# Patient Record
Sex: MALE | Race: OTHER | Employment: PART TIME | URBAN - METROPOLITAN AREA
[De-identification: names, ages, dates, MRNs, and addresses within clinical notes are randomized per-mention and may not be internally consistent; named-entity substitution may affect disease eponyms.]

---

## 2021-03-22 ENCOUNTER — TELEPHONE (OUTPATIENT)
Dept: GASTROENTEROLOGY | Age: 21
End: 2021-03-22

## 2021-03-23 ENCOUNTER — OFFICE VISIT (OUTPATIENT)
Dept: GASTROENTEROLOGY | Age: 21
End: 2021-03-23
Payer: MEDICARE

## 2021-03-23 ENCOUNTER — HOSPITAL ENCOUNTER (OUTPATIENT)
Age: 21
Setting detail: SPECIMEN
Discharge: HOME | End: 2021-03-23
Payer: MEDICARE

## 2021-03-23 VITALS
SYSTOLIC BLOOD PRESSURE: 124 MMHG | HEIGHT: 70 IN | TEMPERATURE: 98.1 F | HEART RATE: 86 BPM | RESPIRATION RATE: 14 BRPM | DIASTOLIC BLOOD PRESSURE: 62 MMHG | BODY MASS INDEX: 19.76 KG/M2 | WEIGHT: 138 LBS

## 2021-03-23 DIAGNOSIS — K52.9 COLITIS: ICD-10-CM

## 2021-03-23 DIAGNOSIS — K61.0 PERIANAL ABSCESS: ICD-10-CM

## 2021-03-23 DIAGNOSIS — K52.9 COLITIS: Primary | ICD-10-CM

## 2021-03-23 PROCEDURE — G8484 FLU IMMUNIZE NO ADMIN: HCPCS | Performed by: INTERNAL MEDICINE

## 2021-03-23 PROCEDURE — G8427 DOCREV CUR MEDS BY ELIG CLIN: HCPCS | Performed by: INTERNAL MEDICINE

## 2021-03-23 PROCEDURE — G8420 CALC BMI NORM PARAMETERS: HCPCS | Performed by: INTERNAL MEDICINE

## 2021-03-23 PROCEDURE — 4004F PT TOBACCO SCREEN RCVD TLK: CPT | Performed by: INTERNAL MEDICINE

## 2021-03-23 PROCEDURE — 99204 OFFICE O/P NEW MOD 45 MIN: CPT | Performed by: INTERNAL MEDICINE

## 2021-03-23 RX ORDER — HYDROCORTISONE ACETATE PRAMOXINE HCL 2.5; 1 G/100G; G/100G
CREAM TOPICAL 3 TIMES DAILY PRN
COMMUNITY
End: 2022-04-06

## 2021-03-23 RX ORDER — LIDOCAINE 50 MG/G
OINTMENT TOPICAL PRN
COMMUNITY
End: 2022-04-06

## 2021-03-23 RX ORDER — ACETAMINOPHEN 500 MG
500 TABLET ORAL EVERY 6 HOURS PRN
COMMUNITY

## 2021-03-23 RX ORDER — POLYETHYLENE GLYCOL 3350 17 G/17G
POWDER, FOR SOLUTION ORAL
Qty: 238 G | Refills: 0 | Status: ON HOLD | OUTPATIENT
Start: 2021-03-23 | End: 2021-04-01 | Stop reason: ALTCHOICE

## 2021-03-23 ASSESSMENT — ENCOUNTER SYMPTOMS
BLOOD IN STOOL: 0
CONSTIPATION: 0
RECTAL PAIN: 1
ABDOMINAL DISTENTION: 0
CHOKING: 0
TROUBLE SWALLOWING: 0
DIARRHEA: 0
COUGH: 0
VOMITING: 0
WHEEZING: 0
ANAL BLEEDING: 0
ABDOMINAL PAIN: 1
NAUSEA: 0

## 2021-03-23 NOTE — PROGRESS NOTES
Reason for Referral:   Steve Gann MD  [address] [facility],  [address] [place] [address]    Chief Complaint   Patient presents with    Ulcerative Colitis     Patient is new, referred for Ulcerative Colitis. He had colonoscopy with Dr Royce Mtz on 3/8/21           HISTORY OF PRESENT ILLNESS: Seema Chamorro is a 21 y.o. male , referred for evaluation of*Crohn's disease    Here for the first time   been having rectal pain for 1 year, diagnosed with leonora anal abscess operated on by  Dr Royce Mtz in Dec   had colonoscopy per him 3/8\2021   ? Fistula   no drainage now    no f/c  Not sure of the colitis extension in his colon or in the small bowel at this time  But the patient continued to have abdominal pain  Is interfering with his studies  Having some rectal pain  Sometimes see pus coming from his rectum  But nothing in the perianal area  Denied any urinary symptoms  Denied any joint/eye vision/skin issues               Past Medical,Family, and Social History reviewed and does contribute to the patient presentingcondition. Patient's PMH/PSH,SH,PSYCH Hx, MEDs, ALLERGIES, and ROS were all reviewed and updated in the appropriate sections. PAST MEDICAL HISTORY:  History reviewed. No pertinent past medical history. History reviewed. No pertinent surgical history. CURRENT MEDICATIONS:    Current Outpatient Medications:     acetaminophen (TYLENOL) 500 MG tablet, Take 500 mg by mouth every 6 hours as needed for Pain, Disp: , Rfl:     Psyllium (METAMUCIL FIBER PO), Take by mouth, Disp: , Rfl:     Hydrocort-Pramoxine, Perianal, (ANALPRAM-HC) 2.5-1 % rectal cream, Place rectally 3 times daily, Disp: , Rfl:     lidocaine (XYLOCAINE) 5 % ointment, Apply topically as needed for Pain Apply topically as needed. , Disp: , Rfl:     ALLERGIES:   No Known Allergies    FAMILY HISTORY:       Problem Relation Age of Onset    Irritable Bowel Syndrome Mother     Diabetes Father          SOCIAL HISTORY:   Social History Socioeconomic History    Marital status: Unknown     Spouse name: Not on file    Number of children: Not on file    Years of education: Not on file    Highest education level: Not on file   Occupational History    Not on file   Social Needs    Financial resource strain: Not on file    Food insecurity     Worry: Not on file     Inability: Not on file    Transportation needs     Medical: Not on file     Non-medical: Not on file   Tobacco Use    Smoking status: Current Some Day Smoker    Smokeless tobacco: Never Used   Substance and Sexual Activity    Alcohol use: Yes     Frequency: Never     Comment: occasional    Drug use: Never    Sexual activity: Not Currently   Lifestyle    Physical activity     Days per week: Not on file     Minutes per session: Not on file    Stress: Not on file   Relationships    Social connections     Talks on phone: Not on file     Gets together: Not on file     Attends Jainism service: Not on file     Active member of club or organization: Not on file     Attends meetings of clubs or organizations: Not on file     Relationship status: Not on file    Intimate partner violence     Fear of current or ex partner: Not on file     Emotionally abused: Not on file     Physically abused: Not on file     Forced sexual activity: Not on file   Other Topics Concern    Not on file   Social History Narrative    Not on file       REVIEW OF SYSTEMS: A 12-point review of systemswas obtained and pertinent positives and negatives were enumerated above in the history of present illness. All other reviewed systems / symptoms were negative. Review of Systems   Constitutional: Negative for appetite change, fatigue and unexpected weight change. HENT: Negative for trouble swallowing. Eyes: Positive for visual disturbance (glasses). Respiratory: Negative for cough, choking and wheezing. Cardiovascular: Negative for chest pain, palpitations and leg swelling.    Gastrointestinal: Positive for abdominal pain and rectal pain. Negative for abdominal distention, anal bleeding, blood in stool, constipation, diarrhea, nausea and vomiting. Genitourinary: Negative for difficulty urinating. Allergic/Immunologic: Negative for environmental allergies and food allergies. Neurological: Negative for dizziness, weakness, light-headedness, numbness and headaches. Hematological: Does not bruise/bleed easily. Psychiatric/Behavioral: Negative for sleep disturbance. The patient is not nervous/anxious. LABORATORY DATA: Reviewed  No results found for: WBC, HGB, HCT, MCV, PLT, NA, K, CL, CO2, BUN, CREATININE, LABPROT, LABALBU, GGT, BILITOT, ALKPHOS, AST, ALT, INR      No results found for: RBC, HGB, MCV, MCH, MCHC, RDW, MPV, BASOPCT, LYMPHSABS, MONOSABS, NEUTROABS, EOSABS, BASOSABS      DIAGNOSTIC TESTING:     No results found. /62   Pulse 86   Temp 98.1 °F (36.7 °C)   Resp 14   Ht 5' 10\" (1.778 m)   Wt 138 lb (62.6 kg)   BMI 19.80 kg/m²     PHYSICAL EXAMINATION: Vital signs reviewed per the nursing documentation. Body mass index is 19.8 kg/m². Physical Exam  Vitals signs and nursing note reviewed. Constitutional:       General: He is not in acute distress. Appearance: He is well-developed. He is not diaphoretic. HENT:      Head: Normocephalic and atraumatic. Eyes:      General: No scleral icterus. Pupils: Pupils are equal, round, and reactive to light. Neck:      Musculoskeletal: Normal range of motion and neck supple. Thyroid: No thyromegaly. Vascular: No JVD. Trachea: No tracheal deviation. Cardiovascular:      Rate and Rhythm: Normal rate and regular rhythm. Heart sounds: Normal heart sounds. No murmur. Pulmonary:      Effort: Pulmonary effort is normal. No respiratory distress. Breath sounds: Normal breath sounds. No wheezing. Abdominal:      General: Bowel sounds are normal. There is no distension.       Palpations: Abdomen is soft. There is no mass. Tenderness: There is no abdominal tenderness. There is no guarding or rebound. Musculoskeletal: Normal range of motion. General: No tenderness. Skin:     General: Skin is warm. Coloration: Skin is not pale. Findings: No erythema or rash. Comments: He is not diaphoretic   Neurological:      Mental Status: He is alert and oriented to person, place, and time. Deep Tendon Reflexes: Reflexes are normal and symmetric. Psychiatric:         Behavior: Behavior normal.         Thought Content: Thought content normal.         Judgment: Judgment normal.         IMPRESSION: Mr. Lester Pulido is a 21 y.o. male with    Diagnosis Orders   1. Colitis  COLONOSCOPY W/ OR W/O BIOPSY    IBD SEROLOGY 7    MRI ENTEROGRAPHY   2. Perianal abscess  COLONOSCOPY W/ OR W/O BIOPSY    IBD SEROLOGY 7    MRI ENTEROGRAPHY     We need to define the distribution of this patient Crohn's disease he does have Crohn's disease we need to define if he has fistulas or not when I see the abscess if it is resolved completely or still not full of this we will get a proceed with colonoscopy, MR enterography, IBD markers and after that we will have a plan for him for long-term management      Diet/life style/natural hx /complication of the dx were all explained in details   Past medical, past surgical, social history, psychiatric history, medications or allergies, all reviewed and  updated    Spent 58 minutes providing patient education and counseling. Thank you for allowing me to participate in the care of Mr. Lester Pulido. For any further questions please do not hesitate to contact me. I have reviewed and agree with the MA/RN ROS. Note is dictated utilizing voice recognition software. Unfortunately this leads to occasional typographical errors. Please contact our office if you have any questions.     Brandie Brown MD  [facility]  O: #[phone]

## 2021-03-27 LAB — IBD PANEL: NORMAL

## 2021-03-28 ENCOUNTER — HOSPITAL ENCOUNTER (OUTPATIENT)
Dept: LAB | Age: 21
Setting detail: SPECIMEN
Discharge: HOME | End: 2021-03-28
Payer: MEDICARE

## 2021-03-28 DIAGNOSIS — Z01.818 PREOP TESTING: Primary | ICD-10-CM

## 2021-03-28 PROCEDURE — U0005 INFEC AGEN DETEC AMPLI PROBE: HCPCS

## 2021-03-28 PROCEDURE — U0003 INFECTIOUS AGENT DETECTION BY NUCLEIC ACID (DNA OR RNA); SEVERE ACUTE RESPIRATORY SYNDROME CORONAVIRUS 2 (SARS-COV-2) (CORONAVIRUS DISEASE [COVID-19]), AMPLIFIED PROBE TECHNIQUE, MAKING USE OF HIGH THROUGHPUT TECHNOLOGIES AS DESCRIBED BY CMS-2020-01-R: HCPCS

## 2021-03-29 ENCOUNTER — HOSPITAL ENCOUNTER (OUTPATIENT)
Dept: PREADMISSION TESTING | Age: 21
Setting detail: OUTPATIENT SURGERY
Discharge: HOME | End: 2021-04-02
Payer: MEDICARE

## 2021-03-29 VITALS — BODY MASS INDEX: 20.04 KG/M2 | HEIGHT: 70 IN | WEIGHT: 140 LBS

## 2021-03-29 LAB
SARS-COV-2: NORMAL
SARS-COV-2: NOT DETECTED
SOURCE: NORMAL

## 2021-03-29 NOTE — PROGRESS NOTES
Pre-op Instructions For Out-Patient Endoscopy Surgery    Medication Instructions:  · Please stop herbs and any supplements now (includes vitamins and minerals). · Please contact your surgeon and prescribing physician for pre-op instructions for any blood thinners. · If you have inhalers/aerosol treatments at home, please use them the morning of your surgery and bring the inhalers with you to the hospital.    · Please take the following medications the morning of your surgery with a sip of water:    none    Surgery Instructions:  1. After midnight before surgery:  Do not eat or drink anything, including water, mints, gum, and hard candy. You may brush your teeth without swallowing. No smoking, chewing tobacco, or street drugs. 2. Please shower or bathe before surgery. 3. Please do not wear any cologne, lotion, powder, jewelry, piercings, perfume, makeup, nail polish, hair accessories, or hair spray on the day of surgery. Wear loose comfortable clothing. 4. Leave your valuables at home. Bring a storage case for any glasses/contacts. 5. An adult who is responsible for you MUST drive you home and should be with you for the first 24 hours after surgery. The Day of Surgery:  · Arrive at [address] [place] [address] Surgery Entrance at the time directed by your surgeon and check in at the desk. · If you have a living will or healthcare power of , please bring a copy. · You will be taken to the pre-op holding area where you will be prepared for surgery. A physical assessment will be performed by a nurse practitioner or house officer. Your IV will be started and you will meet your anesthesiologist.    · We are currently limiting visitors to only one designated person in the pre-op holding area. When you go to surgery, your family will be directed to the surgical waiting room, where the doctor should speak with them after your surgery.     · After surgery, you will be taken to the

## 2021-03-30 ENCOUNTER — TELEPHONE (OUTPATIENT)
Dept: PRIMARY CARE CLINIC | Age: 21
End: 2021-03-30

## 2021-03-31 ENCOUNTER — ANESTHESIA EVENT (OUTPATIENT)
Dept: ENDOSCOPY | Age: 21
End: 2021-03-31
Payer: MEDICARE

## 2021-04-01 ENCOUNTER — HOSPITAL ENCOUNTER (OUTPATIENT)
Age: 21
Setting detail: OUTPATIENT SURGERY
Discharge: HOME | End: 2021-04-01
Attending: INTERNAL MEDICINE | Admitting: INTERNAL MEDICINE
Payer: MEDICARE

## 2021-04-01 ENCOUNTER — ANESTHESIA (OUTPATIENT)
Dept: ENDOSCOPY | Age: 21
End: 2021-04-01
Payer: MEDICARE

## 2021-04-01 ENCOUNTER — TELEPHONE (OUTPATIENT)
Dept: GASTROENTEROLOGY | Age: 21
End: 2021-04-01

## 2021-04-01 VITALS
RESPIRATION RATE: 16 BRPM | TEMPERATURE: 97.8 F | HEIGHT: 70 IN | WEIGHT: 140 LBS | OXYGEN SATURATION: 100 % | BODY MASS INDEX: 20.04 KG/M2 | DIASTOLIC BLOOD PRESSURE: 69 MMHG | HEART RATE: 80 BPM | SYSTOLIC BLOOD PRESSURE: 135 MMHG

## 2021-04-01 VITALS — DIASTOLIC BLOOD PRESSURE: 53 MMHG | OXYGEN SATURATION: 99 % | SYSTOLIC BLOOD PRESSURE: 94 MMHG

## 2021-04-01 DIAGNOSIS — K52.9 IBD (INFLAMMATORY BOWEL DISEASE): Primary | ICD-10-CM

## 2021-04-01 PROCEDURE — 88305 TISSUE EXAM BY PATHOLOGIST: CPT

## 2021-04-01 PROCEDURE — 45380 COLONOSCOPY AND BIOPSY: CPT | Performed by: INTERNAL MEDICINE

## 2021-04-01 PROCEDURE — 3700000000 HC ANESTHESIA ATTENDED CARE: Performed by: INTERNAL MEDICINE

## 2021-04-01 PROCEDURE — 7100000000 HC PACU RECOVERY - FIRST 15 MIN: Performed by: INTERNAL MEDICINE

## 2021-04-01 PROCEDURE — 7100000031 HC ASPR PHASE II RECOVERY - ADDTL 15 MIN: Performed by: INTERNAL MEDICINE

## 2021-04-01 PROCEDURE — 3609010300 HC COLONOSCOPY W/BIOPSY SINGLE/MULTIPLE: Performed by: INTERNAL MEDICINE

## 2021-04-01 PROCEDURE — 3700000001 HC ADD 15 MINUTES (ANESTHESIA): Performed by: INTERNAL MEDICINE

## 2021-04-01 PROCEDURE — 7100000001 HC PACU RECOVERY - ADDTL 15 MIN: Performed by: INTERNAL MEDICINE

## 2021-04-01 PROCEDURE — 2580000003 HC RX 258: Performed by: ANESTHESIOLOGY

## 2021-04-01 PROCEDURE — 7100000010 HC PHASE II RECOVERY - FIRST 15 MIN: Performed by: INTERNAL MEDICINE

## 2021-04-01 PROCEDURE — 2709999900 HC NON-CHARGEABLE SUPPLY: Performed by: INTERNAL MEDICINE

## 2021-04-01 PROCEDURE — 7100000011 HC PHASE II RECOVERY - ADDTL 15 MIN: Performed by: INTERNAL MEDICINE

## 2021-04-01 PROCEDURE — 7100000030 HC ASPR PHASE II RECOVERY - FIRST 15 MIN: Performed by: INTERNAL MEDICINE

## 2021-04-01 PROCEDURE — 6360000002 HC RX W HCPCS: Performed by: NURSE ANESTHETIST, CERTIFIED REGISTERED

## 2021-04-01 RX ORDER — SODIUM CHLORIDE, SODIUM LACTATE, POTASSIUM CHLORIDE, CALCIUM CHLORIDE 600; 310; 30; 20 MG/100ML; MG/100ML; MG/100ML; MG/100ML
INJECTION, SOLUTION INTRAVENOUS CONTINUOUS
Status: DISCONTINUED | OUTPATIENT
Start: 2021-04-01 | End: 2021-04-01 | Stop reason: HOSPADM

## 2021-04-01 RX ORDER — SODIUM CHLORIDE 0.9 % (FLUSH) 0.9 %
10 SYRINGE (ML) INJECTION PRN
Status: DISCONTINUED | OUTPATIENT
Start: 2021-04-01 | End: 2021-04-01 | Stop reason: HOSPADM

## 2021-04-01 RX ORDER — ONDANSETRON 2 MG/ML
4 INJECTION INTRAMUSCULAR; INTRAVENOUS
Status: DISCONTINUED | OUTPATIENT
Start: 2021-04-01 | End: 2021-04-01 | Stop reason: HOSPADM

## 2021-04-01 RX ORDER — OXYCODONE HYDROCHLORIDE AND ACETAMINOPHEN 5; 325 MG/1; MG/1
1 TABLET ORAL PRN
Status: DISCONTINUED | OUTPATIENT
Start: 2021-04-01 | End: 2021-04-01 | Stop reason: HOSPADM

## 2021-04-01 RX ORDER — POLYETHYLENE GLYCOL 3350 17 G/17G
17 POWDER, FOR SOLUTION ORAL DAILY PRN
COMMUNITY
End: 2021-08-04

## 2021-04-01 RX ORDER — PROPOFOL 10 MG/ML
INJECTION, EMULSION INTRAVENOUS PRN
Status: DISCONTINUED | OUTPATIENT
Start: 2021-04-01 | End: 2021-04-01 | Stop reason: SDUPTHER

## 2021-04-01 RX ORDER — SODIUM CHLORIDE 0.9 % (FLUSH) 0.9 %
10 SYRINGE (ML) INJECTION EVERY 12 HOURS SCHEDULED
Status: DISCONTINUED | OUTPATIENT
Start: 2021-04-01 | End: 2021-04-01 | Stop reason: HOSPADM

## 2021-04-01 RX ORDER — LIDOCAINE HYDROCHLORIDE 10 MG/ML
1 INJECTION, SOLUTION EPIDURAL; INFILTRATION; INTRACAUDAL; PERINEURAL
Status: DISCONTINUED | OUTPATIENT
Start: 2021-04-01 | End: 2021-04-01 | Stop reason: HOSPADM

## 2021-04-01 RX ORDER — PROPOFOL 10 MG/ML
INJECTION, EMULSION INTRAVENOUS CONTINUOUS PRN
Status: DISCONTINUED | OUTPATIENT
Start: 2021-04-01 | End: 2021-04-01 | Stop reason: SDUPTHER

## 2021-04-01 RX ORDER — LABETALOL HYDROCHLORIDE 5 MG/ML
5 INJECTION, SOLUTION INTRAVENOUS EVERY 10 MIN PRN
Status: DISCONTINUED | OUTPATIENT
Start: 2021-04-01 | End: 2021-04-01 | Stop reason: HOSPADM

## 2021-04-01 RX ORDER — DIPHENHYDRAMINE HYDROCHLORIDE 50 MG/ML
12.5 INJECTION INTRAMUSCULAR; INTRAVENOUS
Status: DISCONTINUED | OUTPATIENT
Start: 2021-04-01 | End: 2021-04-01 | Stop reason: HOSPADM

## 2021-04-01 RX ORDER — OXYCODONE HYDROCHLORIDE AND ACETAMINOPHEN 5; 325 MG/1; MG/1
2 TABLET ORAL PRN
Status: DISCONTINUED | OUTPATIENT
Start: 2021-04-01 | End: 2021-04-01 | Stop reason: HOSPADM

## 2021-04-01 RX ADMIN — PROPOFOL 40 MG: 10 INJECTION, EMULSION INTRAVENOUS at 11:22

## 2021-04-01 RX ADMIN — PROPOFOL 40 MG: 10 INJECTION, EMULSION INTRAVENOUS at 11:25

## 2021-04-01 RX ADMIN — PROPOFOL 125 MCG/KG/MIN: 10 INJECTION, EMULSION INTRAVENOUS at 11:20

## 2021-04-01 RX ADMIN — PROPOFOL 80 MG: 10 INJECTION, EMULSION INTRAVENOUS at 11:20

## 2021-04-01 RX ADMIN — SODIUM CHLORIDE, POTASSIUM CHLORIDE, SODIUM LACTATE AND CALCIUM CHLORIDE: 600; 310; 30; 20 INJECTION, SOLUTION INTRAVENOUS at 11:03

## 2021-04-01 ASSESSMENT — PAIN SCALES - GENERAL
PAINLEVEL_OUTOF10: 0
PAINLEVEL_OUTOF10: 0

## 2021-04-01 ASSESSMENT — PULMONARY FUNCTION TESTS
PIF_VALUE: 1
PIF_VALUE: 1
PIF_VALUE: 0
PIF_VALUE: 1

## 2021-04-01 ASSESSMENT — PAIN DESCRIPTION - DESCRIPTORS: DESCRIPTORS: ACHING

## 2021-04-01 ASSESSMENT — LIFESTYLE VARIABLES: SMOKING_STATUS: 1

## 2021-04-01 NOTE — ANESTHESIA POSTPROCEDURE EVALUATION
Department of Anesthesiology  Postprocedure Note    Patient: Edyta Murray  MRN: [id number]  YOB: 2000  Date of evaluation: 4/1/2021  Time:  12:47 PM     Procedure Summary     Date: 04/01/21 Room / Location: [facility] ENDO 03 / [facility] ENDO    Anesthesia Start: 0238 Anesthesia Stop: 1140    Procedure: COLONOSCOPY WITH BIOPSY (N/A ) Diagnosis: (COLITIS)    Surgeons: Brandon Guy MD Responsible Provider: Flor Wade MD    Anesthesia Type: MAC ASA Status: 2          Anesthesia Type: MAC    Nataliya Phase I: Nataliya Score: 10    Nataliya Phase II:      Last vitals: Reviewed and per EMR flowsheets.        Anesthesia Post Evaluation    Comments: POST- ANESTHESIA EVALUATION       Pt Name: Edyta Murray  MRN: [id number]  YOB: 2000  Date of evaluation: 4/1/2021  Time:  12:47 PM      BP (!) 113/53   Pulse 72   Temp 97.8 °F (36.6 °C) (Infrared)   Resp 16   Ht 5' 10\" (1.778 m)   Wt 140 lb (63.5 kg)   SpO2 100%   BMI 20.09 kg/m²      Consciousness Level  Awake  Cardiopulmonary Status  Stable  Pain Adequately Treated YES  Nausea / Vomiting  NO  Adequate Hydration  YES  Anesthesia Related Complications NONE      Electronically signed by lFor Wade MD on 4/1/2021 at 12:47 PM     POST- ANESTHESIA EVALUATION       Pt Name: Edyta Murray  MRN: [id number]  YOB: 2000  Date of evaluation: 4/1/2021  Time:  12:47 PM      BP (!) 113/53   Pulse 72   Temp 97.8 °F (36.6 °C) (Infrared)   Resp 16   Ht 5' 10\" (1.778 m)   Wt 140 lb (63.5 kg)   SpO2 100%   BMI 20.09 kg/m²      Consciousness Level  Awake  Cardiopulmonary Status  Stable  Pain Adequately Treated YES  Nausea / Vomiting  NO  Adequate Hydration  YES  Anesthesia Related Complications NONE      Electronically signed by Flor Wade MD on 4/1/2021 at 12:47 PM

## 2021-04-01 NOTE — TELEPHONE ENCOUNTER
Patient's insurance called stating that the patient reached out to them stating that the MRI is \"pending review\" and that is why the MRI cannot be moved up. Writer explained to both the patient and the insurance company that our office does not submit pre certifications for outpatient testing, all of that is done by the facility where the test is scheduled. Patient verbalized understanding. Patient is just concerned that Dr. Buzz Chavarria told him specifically that he should follow up in the office in a week after his colonoscopy today. Patient states that he was offered an appointment on 4/7 but that is when his MRI is scheduled for. Patient stated that if his MRI order is changed to STAT, then he can truly follow up with Dr. Buzz Chavarria in one week, instead of waiting to be seen on 4/13 at the [facility] office.

## 2021-04-01 NOTE — H&P
HISTORY and Treinta ROSALIE [address]       NAME:  Victoria Turcios  MRN: [id number]   YOB: 2000   Date: 4/1/2021   Age: 21 y.o. Gender: male       COMPLAINT AND PRESENT HISTORY:   Victoria Turcios is 21 y.o., male, having a Diagnostic Colonoscopy. Prior Colonoscopy was done 8 days ago with  (Colorectal)  he also has his leonora anal abscess drained. Patient has hx Colitis and leonora anal abscess. Patient had visit with Dr. Jamee Hughes as new patient to be evaluated for Crohn's disease  Patient denies any  FH of Colon Cancer. Pt states that his mother has IBS. Patient reports  changes in bowel habits. Patient states that he has bowel urgency. He states he has 2-3 stools a day and becomes very exhausted. Pt report having soft stools as he takes metamucil daily. Patient states that he takes Tylenol daily up to 4 times. No difficulty in bowel movements, just pain. No GI /Rectal bleeding currently, but states he did have it and has stopped approp 6 months ago. experiencing red/  BRBPR stools. Patient states that his main issue is that he has rectal pain with defecation or any type of movement. Patient states that he was told that he has fissure. by Dr. Ahsan Donahue. Patient states that onset of his rectal pain has been since late July 2020. Patient has a history of abd. pain in the epigastric area, The pain is described as  squeezing . pt states that he drinking a lot of Tea to help. \" I really like to eat, but lately I have been forcing myself to eat a meal\"  \" I have been slowly losing weight\". Pt reports having nausea and has fevers. Any significant  medical conditions: related to breathing or cardiac. Any anticoagulants or blood thinners: no   Patient denies any issues with Anesthesia  No heartburn or dysphagia,  No chest pain, palpitations or diaphoresis. No recent URI, SOB, fever or chills.        PAST MEDICAL HISTORY     Past Medical History:   Diagnosis Date    Anal fissure     Colitis     Meagan-rectal abscess     Rectal pain        SURGICAL HISTORY       Past Surgical History:   Procedure Laterality Date    COLONOSCOPY  03/08/2021    TONSILLECTOMY  2006       FAMILY HISTORY       Family History   Problem Relation Age of Onset    Irritable Bowel Syndrome Mother     Diabetes Father        SOCIAL HISTORY       Social History     Socioeconomic History    Marital status: Single     Spouse name: None    Number of children: None    Years of education: None    Highest education level: None   Occupational History    None   Social Needs    Financial resource strain: None    Food insecurity     Worry: None     Inability: None    Transportation needs     Medical: None     Non-medical: None   Tobacco Use    Smoking status: Current Some Day Smoker    Smokeless tobacco: Never Used   Substance and Sexual Activity    Alcohol use: Yes     Frequency: Never     Comment: occasional    Drug use: Never    Sexual activity: Not Currently   Lifestyle    Physical activity     Days per week: None     Minutes per session: None    Stress: None   Relationships    Social connections     Talks on phone: None     Gets together: None     Attends Mandaeism service: None     Active member of club or organization: None     Attends meetings of clubs or organizations: None     Relationship status: None    Intimate partner violence     Fear of current or ex partner: None     Emotionally abused: None     Physically abused: None     Forced sexual activity: None   Other Topics Concern    None   Social History Narrative    None           REVIEW OF SYSTEMS      No Known Allergies    No current facility-administered medications on file prior to encounter.       Current Outpatient Medications on File Prior to Encounter   Medication Sig Dispense Refill    acetaminophen (TYLENOL) 500 MG tablet Take 500 mg by mouth every 6 hours as needed for Pain      Psyllium (METAMUCIL FIBER PO) Take by mouth      Hydrocort-Pramoxine, Perianal, (ANALPRAM-HC) 2.5-1 % rectal cream Place rectally 3 times daily      lidocaine (XYLOCAINE) 5 % ointment Apply topically as needed for Pain Apply topically as needed. Negative except for what is mentioned in the HPI. GENERAL PHYSICAL EXAM     Vitals :   See vital signs in RN flow sheet. GENERAL APPEARANCE:   Elias Singh is 21 y.o.,  male, appears underweight, nourished, conscious, alert. Does not appear to be distress or pain at this time. SKIN:  Warm, dry, no cyanosis or jaundice. HEAD:  Normocephalic, atraumatic, no swelling or tenderness. EYES:  Pupils equal, reactive to light. EARS:  No discharge, no marked hearing loss. NOSE:  No rhinorrhea, epistaxis or septal deformity. THROAT:  Not congested. No ulceration bleeding or discharge. NECK:  No stiffness, trachea central.  No palpable masses or L.N.                 CHEST:  Symmetrical and equal on expansion. HEART:  RRR S1 > S2. No audible murmurs or gallops. LUNGS:  Equal on expansion, normal breath sounds. No adventitious sounds. ABDOMEN:  Obese. Soft on palpation. No dysphagia, No localized tenderness. No guarding or rigidity. No palpable hepatosplenomegaly. LYMPHATICS:  No palpable cervical lymphadenopathy. LOCOMOTOR, BACK AND SPINE:  No tenderness or deformities. EXTREMITIES:  Symmetrical, no pretibial edema. Josés sign negative. No discoloration or ulcerations. NEUROLOGIC:  The patient is conscious, alert, oriented,Cranial nerve II-XII intact, taste and smell were not examined. No apparent focal sensory or motor deficits. PROVISIONAL DIAGNOSES / SURGERY:      COLONOSCOPY DIAGNOSTIC    COLITIS    There are no active problems to display for this patient.           SORIN PURDY, APRN - CNP on 4/1/2021 at 10:33 AM

## 2021-04-01 NOTE — OP NOTE
Operative Note    PROCEDURE NOTE    DATE OF PROCEDURE: 4/1/2021    SURGEON: Savanna Arriaza MD  Facility : [facility]  ASSISTANT: None  Anesthesia: MAC  PREOPERATIVE DIAGNOSIS:   Perianal fistula/abscess status post surgery to evaluate IBD and start on treatment    POSTOPERATIVE DIAGNOSIS: as described below    OPERATION: Total colonoscopy     ANESTHESIA: Moderate Sedation    ESTIMATED BLOOD LOSS: less than 50     COMPLICATIONS: None. SPECIMENS:  Was Obtained: There is large ulcers in the ileum with the inflammation and granulation receiving images down biopsies were taken    Scattered also throughout the colon also biopsies were taken        HISTORY: The patient is a 21y.o. year old male with history of above preop diagnosis. I recommended colonoscopy with possible biopsy or polypectomy and I explained the risk, benefits, expected outcome, and alternatives to the procedure. Risks included but are not limited to bleeding, infection, respiratory distress, hypotension, and perforation of the colon and possibility of missing a lesion. The patient understands and is in agreement. The patient was counseled at length about the risks of jaimie Covid-19 during their perioperative period and any recovery window from their procedure. The patient was made aware that jaimie Covid-19  may worsen their prognosis for recovering from their procedure  and lend to a higher morbidity and/or mortality risk. All material risks, benefits, and reasonable alternatives including postponing the procedure were discussed. The patient does wish to proceed with the procedure at this time. PROCEDURE: The patient was given IV conscious sedation. The patient's SPO2 remained above 90% throughout the procedure. The colonoscope was inserted per rectum and advanced under direct vision to the cecum without difficulty. Post sedation note : The patient's SPO2 remained above 90% throughout the procedure. the vital signs remained stable , and no immediate complication form the procedure noted, patient will be ready for d/c when criteria is met . The prep was fair. Findings:  Terminal ileum: abnormal: The ileocecal valve was significantly inflamed with ulcer right at the orifice itself , we went in the ileum and around 15 cm above the IVC, there is large linear deep ulcers with cobblestoning, granulation and edema looks like Crohn's disease biopsies were taken please see image below    Cecum/Ascending colon: Very infrequent small ulcers biopsies were taken    Transverse colon: normal    Descending/Sigmoid colon: abnormal: Numerous tiny little aphthous type ulcers biopsies were taken    Rectum/Anus: examined in normal and retroflexed positions and was abnormal: The orifice which looks like a fistula orifice which is wide open just beyond the anal rim, there is hemorrhoids  and granulation tissue please see the images    Withdrawal Time was (minutes): 10    The colon was decompressed and the scope was removed. The patient tolerated the procedure well. Recommendations/Plan:   1. Lifestyle and dietary modifications as discussed  2. We will start this patient on Remicade. 3. F/U Biopsies  4. F/U In OfficeYes  5. Discussed with the family  6.  Repeat colonoscopy [id number]    Electronically signed by Niharika Mendoza MD  on 4/1/2021 at 11:37 AM

## 2021-04-01 NOTE — ANESTHESIA PRE PROCEDURE
Department of Anesthesiology  Preprocedure Note       Name:  Bertin Armando   Age:  21 y.o.  :  2000                                          MRN:  [id number]         Date:  2021      Surgeon: Waqar Pederson):  Phoebe Figueroa MD    Procedure: Procedure(s):  COLONOSCOPY DIAGNOSTIC    Medications prior to admission:   Prior to Admission medications    Medication Sig Start Date End Date Taking? Authorizing Provider   acetaminophen (TYLENOL) 500 MG tablet Take 500 mg by mouth every 6 hours as needed for Pain    Historical Provider, MD   Psyllium (METAMUCIL FIBER PO) Take by mouth    Historical Provider, MD   Hydrocort-Pramoxine, Perianal, (ANALPRAM-HC) 2.5-1 % rectal cream Place rectally 3 times daily    Historical Provider, MD   lidocaine (XYLOCAINE) 5 % ointment Apply topically as needed for Pain Apply topically as needed. Historical Provider, MD   magnesium citrate solution Take 296 mLs by mouth once for 1 dose 3/23/21 3/23/21  Jose Baxter MD   bisacodyl (DULCOLAX) 5 MG EC tablet TAKE 4 TABS AT 10 AM THE DAY PRIOR TO COLONOSCOPY 3/23/21   Jose Baxter MD   polyethylene glycol (GLYCOLAX) 17 GM/SCOOP powder Use as directed by following your patient instructions given by office. 3/23/21   Phoebe Figueroa MD       Current medications:    Current Facility-Administered Medications   Medication Dose Route Frequency Provider Last Rate Last Admin    sodium chloride flush 0.9 % injection 10 mL  10 mL Intravenous 2 times per day Bora Bhakta MD        sodium chloride flush 0.9 % injection 10 mL  10 mL Intravenous PRN Bora Bhakta MD        lidocaine PF 1 % injection 1 mL  1 mL Intradermal Once PRN Bora Bhakta MD        lactated ringers infusion   Intravenous Continuous Bora Bhakta MD           Allergies:  No Known Allergies    Problem List:  There is no problem list on file for this patient. Past Medical History:  History reviewed. No pertinent past medical history.     Past Surgical History:        Procedure Laterality Date    COLONOSCOPY  03/08/2021    TONSILLECTOMY  2006       Social History:    Social History     Tobacco Use    Smoking status: Current Some Day Smoker    Smokeless tobacco: Never Used   Substance Use Topics    Alcohol use: Yes     Frequency: Never     Comment: occasional                                Ready to quit: Not Answered  Counseling given: Not Answered      Vital Signs (Current): There were no vitals filed for this visit. BP Readings from Last 3 Encounters:   03/23/21 124/62       NPO Status:                                                                                 BMI:   Wt Readings from Last 3 Encounters:   03/29/21 140 lb (63.5 kg)   03/23/21 138 lb (62.6 kg)     There is no height or weight on file to calculate BMI.    CBC: No results found for: WBC, RBC, HGB, HCT, MCV, RDW, PLT    CMP: No results found for: NA, K, CL, CO2, BUN, CREATININE, GFRAA, AGRATIO, LABGLOM, GLUCOSE, PROT, CALCIUM, BILITOT, ALKPHOS, AST, ALT    POC Tests: No results for input(s): POCGLU, POCNA, POCK, POCCL, POCBUN, POCHEMO, POCHCT in the last 72 hours.     Coags: No results found for: PROTIME, INR, APTT    HCG (If Applicable): No results found for: PREGTESTUR, PREGSERUM, HCG, HCGQUANT     ABGs: No results found for: PHART, PO2ART, GPO0RRD, ZMF1UNX, BEART, [id number]     Type & Screen (If Applicable):  No results found for: LABABO, LABRH    Drug/Infectious Status (If Applicable):  No results found for: HIV, HEPCAB    COVID-19 Screening (If Applicable):   Lab Results   Component Value Date    COVID19 Not Detected 03/28/2021           Anesthesia Evaluation  Patient summary reviewed and Nursing notes reviewed no history of anesthetic complications:   Airway: Mallampati: II  TM distance: >3 FB   Neck ROM: full  Mouth opening: > = 3 FB Dental: normal exam         Pulmonary:normal exam  breath sounds clear to auscultation  (+) current smoker Cardiovascular:Negative CV ROS            Rhythm: regular  Rate: normal                    Neuro/Psych:   Negative Neuro/Psych ROS              GI/Hepatic/Renal:            ROS comment: Meagan-rectal abscess  Colitis-  Anal fissure  Had colonoscopy last night. Endo/Other: Negative Endo/Other ROS                    Abdominal:           Vascular: negative vascular ROS. Anesthesia Plan      MAC     ASA 2       Induction: intravenous. MIPS: Prophylactic antiemetics administered. Anesthetic plan and risks discussed with patient. Plan discussed with CRNA.                   Birgit Bains MD   4/1/2021

## 2021-04-01 NOTE — TELEPHONE ENCOUNTER
Patient called asking if his MRI order could be changed to STAT, so he can get it done sooner. Patient spoke with scheduling department and they informed him that the only way they could move it up to a sooner date is if it is changed to that priority. Patient stated that he is in so much pain and doesn't feel like he can wait that long to get the test done and follow up with Dr. Claudia Turner after his colonoscopy. Patient asked if he would get a call back soon. Writer did inform the patient that Dr. Claudia Turner is on call this week and all that could be done on my end is send message back to his nurse and she would have to address it with him  .

## 2021-04-02 LAB — SURGICAL PATHOLOGY REPORT: NORMAL

## 2021-04-02 NOTE — TELEPHONE ENCOUNTER
Patient called checking on status of his MRI order being changed to STAT. Writer advised to patient again that Dr. Chucho Claros is on call, but as soon our office gets an answer the nurse will call him back. Patient verbalized understanding. Patient also was concerned that if his order does not get changed to STAT by his scheduled date on 4/7, will he have to wait to get treatment started if there are abnormal results. Writer advised patient that if something were emergent that would need discussed or if treatment needed to be started the clinical staff would reach out to the patient before his upcoming appointment with Dr. Chucho Claros on 4/13. Patient thanked caller and call was ended.

## 2021-04-05 ENCOUNTER — TELEPHONE (OUTPATIENT)
Dept: GASTROENTEROLOGY | Age: 21
End: 2021-04-05

## 2021-04-07 ENCOUNTER — TELEPHONE (OUTPATIENT)
Dept: GASTROENTEROLOGY | Age: 21
End: 2021-04-07

## 2021-04-07 DIAGNOSIS — K52.9 IBD (INFLAMMATORY BOWEL DISEASE): ICD-10-CM

## 2021-04-07 RX ORDER — METHYLPREDNISOLONE SODIUM SUCCINATE 125 MG/2ML
125 INJECTION, POWDER, LYOPHILIZED, FOR SOLUTION INTRAMUSCULAR; INTRAVENOUS ONCE
Status: CANCELLED | OUTPATIENT
Start: 2021-04-20 | End: 2021-04-07

## 2021-04-07 RX ORDER — SODIUM CHLORIDE 9 MG/ML
INJECTION, SOLUTION INTRAVENOUS CONTINUOUS
Status: CANCELLED | OUTPATIENT
Start: 2021-04-20

## 2021-04-07 RX ORDER — CETIRIZINE HYDROCHLORIDE 5 MG/1
10 TABLET ORAL ONCE
Status: CANCELLED | OUTPATIENT
Start: 2021-04-14

## 2021-04-07 RX ORDER — EPINEPHRINE 1 MG/ML
0.3 INJECTION, SOLUTION, CONCENTRATE INTRAVENOUS PRN
Status: CANCELLED | OUTPATIENT
Start: 2021-04-20

## 2021-04-07 RX ORDER — ACETAMINOPHEN 325 MG/1
650 TABLET ORAL ONCE
Status: CANCELLED | OUTPATIENT
Start: 2021-04-20

## 2021-04-07 RX ORDER — SODIUM CHLORIDE 9 MG/ML
25 INJECTION, SOLUTION INTRAVENOUS PRN
Status: CANCELLED | OUTPATIENT
Start: 2021-04-20

## 2021-04-07 RX ORDER — CETIRIZINE HYDROCHLORIDE 10 MG/1
10 TABLET ORAL ONCE
Status: CANCELLED | OUTPATIENT
Start: 2021-04-14

## 2021-04-07 RX ORDER — HEPARIN SODIUM (PORCINE) LOCK FLUSH IV SOLN 100 UNIT/ML 100 UNIT/ML
500 SOLUTION INTRAVENOUS PRN
Status: CANCELLED | OUTPATIENT
Start: 2021-04-20

## 2021-04-07 RX ORDER — SODIUM CHLORIDE 9 MG/ML
INJECTION, SOLUTION INTRAVENOUS ONCE
Status: CANCELLED | OUTPATIENT
Start: 2021-04-20 | End: 2021-04-07

## 2021-04-07 RX ORDER — DIPHENHYDRAMINE HCL 25 MG
25 TABLET ORAL ONCE
Status: CANCELLED | OUTPATIENT
Start: 2021-04-20

## 2021-04-07 RX ORDER — DIPHENHYDRAMINE HYDROCHLORIDE 50 MG/ML
50 INJECTION INTRAMUSCULAR; INTRAVENOUS ONCE
Status: CANCELLED | OUTPATIENT
Start: 2021-04-20 | End: 2021-04-07

## 2021-04-07 RX ORDER — SODIUM CHLORIDE 0.9 % (FLUSH) 0.9 %
5-40 SYRINGE (ML) INJECTION PRN
Status: CANCELLED | OUTPATIENT
Start: 2021-04-20

## 2021-04-12 ENCOUNTER — HOSPITAL ENCOUNTER (OUTPATIENT)
Age: 21
Setting detail: SPECIMEN
Discharge: HOME | End: 2021-04-12
Payer: MEDICARE

## 2021-04-12 DIAGNOSIS — K52.9 IBD (INFLAMMATORY BOWEL DISEASE): ICD-10-CM

## 2021-04-12 LAB
HBV SURFACE AB TITR SER: 30.41 MIU/ML
HEPATITIS B CORE TOTAL ANTIBODY: NONREACTIVE
HEPATITIS B SURFACE ANTIGEN: NONREACTIVE
HEPATITIS C ANTIBODY: NONREACTIVE

## 2021-04-13 ENCOUNTER — OFFICE VISIT (OUTPATIENT)
Dept: GASTROENTEROLOGY | Age: 21
End: 2021-04-13
Payer: MEDICARE

## 2021-04-13 ENCOUNTER — HOSPITAL ENCOUNTER (OUTPATIENT)
Dept: MRI IMAGING | Age: 21
Discharge: HOME | End: 2021-04-15
Payer: MEDICARE

## 2021-04-13 VITALS
SYSTOLIC BLOOD PRESSURE: 124 MMHG | RESPIRATION RATE: 16 BRPM | BODY MASS INDEX: 19.76 KG/M2 | WEIGHT: 138 LBS | TEMPERATURE: 98.8 F | HEIGHT: 70 IN | HEART RATE: 82 BPM | DIASTOLIC BLOOD PRESSURE: 63 MMHG

## 2021-04-13 DIAGNOSIS — K61.0 PERIANAL ABSCESS: ICD-10-CM

## 2021-04-13 DIAGNOSIS — K52.9 COLITIS: ICD-10-CM

## 2021-04-13 DIAGNOSIS — K61.0 PERIANAL ABSCESS: Primary | ICD-10-CM

## 2021-04-13 DIAGNOSIS — K52.9 IBD (INFLAMMATORY BOWEL DISEASE): ICD-10-CM

## 2021-04-13 PROCEDURE — 99214 OFFICE O/P EST MOD 30 MIN: CPT | Performed by: INTERNAL MEDICINE

## 2021-04-13 PROCEDURE — G8427 DOCREV CUR MEDS BY ELIG CLIN: HCPCS | Performed by: INTERNAL MEDICINE

## 2021-04-13 PROCEDURE — 6360000004 HC RX CONTRAST MEDICATION: Performed by: INTERNAL MEDICINE

## 2021-04-13 PROCEDURE — 2580000003 HC RX 258: Performed by: INTERNAL MEDICINE

## 2021-04-13 PROCEDURE — 72197 MRI PELVIS W/O & W/DYE: CPT

## 2021-04-13 PROCEDURE — G8420 CALC BMI NORM PARAMETERS: HCPCS | Performed by: INTERNAL MEDICINE

## 2021-04-13 PROCEDURE — 4004F PT TOBACCO SCREEN RCVD TLK: CPT | Performed by: INTERNAL MEDICINE

## 2021-04-13 PROCEDURE — 2500000003 HC RX 250 WO HCPCS: Performed by: INTERNAL MEDICINE

## 2021-04-13 PROCEDURE — A9579 GAD-BASE MR CONTRAST NOS,1ML: HCPCS | Performed by: INTERNAL MEDICINE

## 2021-04-13 RX ORDER — SODIUM CHLORIDE 0.9 % (FLUSH) 0.9 %
10 SYRINGE (ML) INJECTION ONCE
Status: COMPLETED | OUTPATIENT
Start: 2021-04-13 | End: 2021-04-13

## 2021-04-13 RX ORDER — 0.9 % SODIUM CHLORIDE 0.9 %
80 INTRAVENOUS SOLUTION INTRAVENOUS ONCE
Status: COMPLETED | OUTPATIENT
Start: 2021-04-13 | End: 2021-04-13

## 2021-04-13 RX ADMIN — GLUCAGON HYDROCHLORIDE 0.3 MG: KIT at 10:26

## 2021-04-13 RX ADMIN — SODIUM CHLORIDE 80 ML: 9 INJECTION, SOLUTION INTRAVENOUS at 10:41

## 2021-04-13 RX ADMIN — BARIUM SULFATE 450 ML: 1 SUSPENSION ORAL at 10:41

## 2021-04-13 RX ADMIN — GADOTERIDOL 14 ML: 279.3 INJECTION, SOLUTION INTRAVENOUS at 10:40

## 2021-04-13 RX ADMIN — BARIUM SULFATE 450 ML: 1 SUSPENSION ORAL at 10:39

## 2021-04-13 RX ADMIN — Medication 10 ML: at 10:41

## 2021-04-13 RX ADMIN — GLUCAGON HYDROCHLORIDE 0.3 MG: KIT at 10:16

## 2021-04-13 ASSESSMENT — ENCOUNTER SYMPTOMS
TROUBLE SWALLOWING: 0
ABDOMINAL DISTENTION: 0
ANAL BLEEDING: 0
CHOKING: 0
VOMITING: 0
RECTAL PAIN: 1
DIARRHEA: 0
WHEEZING: 0
NAUSEA: 0
COUGH: 0
CONSTIPATION: 0
BLOOD IN STOOL: 0
ABDOMINAL PAIN: 1

## 2021-04-13 NOTE — PROGRESS NOTES
GI CLINIC FOLLOW UP    INTERVAL HISTORY:   Marina Santana MD  [address] [facility] [place] [address],  [address]    Chief Complaint   Patient presents with   Juliocesar Mohs Disease     Patient is f/u on MRI and colonoscopy. HISTORY OF PRESENT ILLNESS: Lacey Magallon is a 21 y.o. male , referred for evaluation of*IBD, perianal abscess   patient is here for follow-up    Colonoscopy was done please refer to the result as below  We have ordered his Remicade is in the preauthorization phase and getting the labs done in the TB  Test  Denied any bleeding denied any fever or chills  Still having some abdominal cramping resolved very little but his main issue is the irritation around the perianal area we ordered MR E    And there is no fistulas no abscesses seen anymore        DATE OF PROCEDURE: 4/1/2021     SURGEON: Niharika Mendoza MD  Facility : [facility]  ASSISTANT: None  Anesthesia: MAC  PREOPERATIVE DIAGNOSIS:   Perianal fistula/abscess status post surgery to evaluate IBD and start on treatment     POSTOPERATIVE DIAGNOSIS: as described below     OPERATION: Total colonoscopy      ANESTHESIA: Moderate Sedation     ESTIMATED BLOOD LOSS: less than 50      COMPLICATIONS: None.      SPECIMENS:  Was Obtained: There is large ulcers in the ileum with the inflammation and granulation receiving images down biopsies were taken     Scattered also throughout the colon also biopsies were taken      HISTORY: The patient is a 21y.o. year old male with history of above preop diagnosis. I recommended colonoscopy with possible biopsy or polypectomy and I explained the risk, benefits, expected outcome, and alternatives to the procedure. Risks included but are not limited to bleeding, infection, respiratory distress, hypotension, and perforation of the colon and possibility of missing a lesion.   The patient understands and is in agreement.       The patient was counseled at length about the risks of jaimie Covid-19 during their perioperative period and any recovery window from their procedure.  The patient was made aware that jaimie Covid-19  may worsen their prognosis for recovering from their procedure  and lend to a higher morbidity and/or mortality risk.  All material risks, benefits, and reasonable alternatives including postponing the procedure were discussed. The patient does wish to proceed with the procedure at this time.      PROCEDURE: The patient was given IV conscious sedation. The patient's SPO2 remained above 90% throughout the procedure.      The colonoscope was inserted per rectum and advanced under direct vision to the cecum without difficulty.       Post sedation note : The patient's SPO2 remained above 90% throughout the procedure. the vital signs remained stable , and no immediate complication form the procedure noted, patient will be ready for d/c when criteria is met .      The prep was fair.       Findings:  Terminal ileum: abnormal: The ileocecal valve was significantly inflamed with ulcer right at the orifice itself , we went in the ileum and around 15 cm above the IVC, there is large linear deep ulcers with cobblestoning, granulation and edema looks like Crohn's disease biopsies were taken please see image below     Cecum/Ascending colon: Very infrequent small ulcers biopsies were taken     Transverse colon: normal     Descending/Sigmoid colon: abnormal: Numerous tiny little aphthous type ulcers biopsies were taken     Rectum/Anus: examined in normal and retroflexed positions and was abnormal: The orifice which looks like a fistula orifice which is wide open just beyond the anal rim, there is hemorrhoids  and granulation tissue please see the images     Withdrawal Time was (minutes): 10     The colon was decompressed and the scope was removed. The patient tolerated the procedure well.      Recommendations/Plan:   1. Lifestyle and dietary modifications as discussed  2.  We will start this patient on Remicade. 3. F/U Biopsies  4. F/U In OfficeYes  5. Discussed with the family  6. Repeat colonoscopy [id number]     Electronically signed by Trent Jamil MD  on 4/1/2021 at 11:37 AM   DATE OF PROCEDURE: 4/1/2021     SURGEON: Trent Jamil MD  Facility : [facility]  ASSISTANT: None  Anesthesia: MAC  PREOPERATIVE DIAGNOSIS:   Perianal fistula/abscess status post surgery to evaluate IBD and start on treatment     POSTOPERATIVE DIAGNOSIS: as described below     OPERATION: Total colonoscopy      ANESTHESIA: Moderate Sedation     ESTIMATED BLOOD LOSS: less than 50      COMPLICATIONS: None.      SPECIMENS:  Was Obtained: There is large ulcers in the ileum with the inflammation and granulation receiving images down biopsies were taken     Scattered also throughout the colon also biopsies were taken      HISTORY: The patient is a 21y.o. year old male with history of above preop diagnosis. I recommended colonoscopy with possible biopsy or polypectomy and I explained the risk, benefits, expected outcome, and alternatives to the procedure. Risks included but are not limited to bleeding, infection, respiratory distress, hypotension, and perforation of the colon and possibility of missing a lesion. The patient understands and is in agreement.       The patient was counseled at length about the risks of jaimie Covid-19 during their perioperative period and any recovery window from their procedure.  The patient was made aware that jaimie Covid-19  may worsen their prognosis for recovering from their procedure  and lend to a higher morbidity and/or mortality risk.  All material risks, benefits, and reasonable alternatives including postponing the procedure were discussed. The patient does wish to proceed with the procedure at this time.      PROCEDURE: The patient was given IV conscious sedation.   The patient's SPO2 remained above 90% throughout the procedure.      The colonoscope was inserted per rectum and advanced under direct vision to the cecum without difficulty.       Post sedation note : The patient's SPO2 remained above 90% throughout the procedure. the vital signs remained stable , and no immediate complication form the procedure noted, patient will be ready for d/c when criteria is met .      The prep was fair.       Findings:  Terminal ileum: abnormal: The ileocecal valve was significantly inflamed with ulcer right at the orifice itself , we went in the ileum and around 15 cm above the IVC, there is large linear deep ulcers with cobblestoning, granulation and edema looks like Crohn's disease biopsies were taken please see image below     Cecum/Ascending colon: Very infrequent small ulcers biopsies were taken     Transverse colon: normal     Descending/Sigmoid colon: abnormal: Numerous tiny little aphthous type ulcers biopsies were taken     Rectum/Anus: examined in normal and retroflexed positions and was abnormal: The orifice which looks like a fistula orifice which is wide open just beyond the anal rim, there is hemorrhoids  and granulation tissue please see the images     Withdrawal Time was (minutes): 10     The colon was decompressed and the scope was removed. The patient tolerated the procedure well.      Recommendations/Plan:   7. Lifestyle and dietary modifications as discussed  8. We will start this patient on Remicade. 9. F/U Biopsies  10. F/U In OfficeYes  11. Discussed with the family  15. Repeat colonoscopy [id number]     Electronically signed by Martínez Kelly MD  on 4/1/2021 at 11:37 AM       Past Medical,Family, and Social History reviewed and does contribute to the patient presentingcondition. Patient's PMH/PSH,SH,PSYCH Hx, MEDs, ALLERGIES, and ROS were all reviewed and updated in the appropriate sections.     PAST MEDICAL HISTORY:  Past Medical History:   Diagnosis Date    Anal fissure     Colitis     Meagan-rectal abscess     Rectal pain        Past Surgical History:   Procedure Laterality Date    COLONOSCOPY  03/08/2021    COLONOSCOPY N/A 4/1/2021    COLONOSCOPY WITH BIOPSY performed by Shaneka Darby MD at [address] [place] [address]  2006       CURRENT MEDICATIONS:    Current Outpatient Medications:     polyethylene glycol (GLYCOLAX) 17 g packet, Take 17 g by mouth daily as needed for Constipation, Disp: , Rfl:     acetaminophen (TYLENOL) 500 MG tablet, Take 500 mg by mouth every 6 hours as needed for Pain, Disp: , Rfl:     Psyllium (METAMUCIL FIBER PO), Take by mouth, Disp: , Rfl:     Hydrocort-Pramoxine, Perianal, (ANALPRAM-HC) 2.5-1 % rectal cream, Place rectally 3 times daily, Disp: , Rfl:     lidocaine (XYLOCAINE) 5 % ointment, Apply topically as needed for Pain Apply topically as needed. , Disp: , Rfl:     ALLERGIES:   No Known Allergies    FAMILY HISTORY:       Problem Relation Age of Onset    Irritable Bowel Syndrome Mother     Diabetes Father          SOCIAL HISTORY:   Social History     Socioeconomic History    Marital status: Single     Spouse name: Not on file    Number of children: Not on file    Years of education: Not on file    Highest education level: Not on file   Occupational History    Not on file   Social Needs    Financial resource strain: Not on file    Food insecurity     Worry: Not on file     Inability: Not on file    Transportation needs     Medical: Not on file     Non-medical: Not on file   Tobacco Use    Smoking status: Current Some Day Smoker    Smokeless tobacco: Never Used   Substance and Sexual Activity    Alcohol use: Yes     Frequency: Never     Comment: occasional    Drug use: Never    Sexual activity: Not Currently   Lifestyle    Physical activity     Days per week: Not on file     Minutes per session: Not on file    Stress: Not on file   Relationships    Social connections     Talks on phone: Not on file     Gets together: Not on file     Attends Muslim service: Not on file     Active member of club or organization: Not on file     Attends meetings of clubs or organizations: Not on file     Relationship status: Not on file    Intimate partner violence     Fear of current or ex partner: Not on file     Emotionally abused: Not on file     Physically abused: Not on file     Forced sexual activity: Not on file   Other Topics Concern    Not on file   Social History Narrative    Not on file       REVIEW OF SYSTEMS: A 12-point review of systemswas obtained and pertinent positives and negatives were enumerated above in the history of present illness. All other reviewed systems / symptoms were negative. Review of Systems   Constitutional: Negative for appetite change, fatigue and unexpected weight change. HENT: Positive for mouth sores. Negative for trouble swallowing. Eyes: Positive for visual disturbance (glasses). Respiratory: Negative for cough, choking and wheezing. Cardiovascular: Negative for chest pain, palpitations and leg swelling. Gastrointestinal: Positive for abdominal pain and rectal pain. Negative for abdominal distention, anal bleeding, blood in stool, constipation, diarrhea, nausea and vomiting. Genitourinary: Negative for difficulty urinating. Allergic/Immunologic: Negative for environmental allergies and food allergies. Neurological: Negative for dizziness, weakness, light-headedness, numbness and headaches. Hematological: Does not bruise/bleed easily. Psychiatric/Behavioral: Negative for sleep disturbance. The patient is not nervous/anxious.             LABORATORY DATA: Reviewed  No results found for: WBC, HGB, HCT, MCV, PLT, NA, K, CL, CO2, BUN, CREATININE, LABPROT, LABALBU, GGT, BILITOT, ALKPHOS, AST, ALT, INR      No results found for: RBC, HGB, MCV, MCH, MCHC, RDW, MPV, BASOPCT, LYMPHSABS, MONOSABS, NEUTROABS, EOSABS, BASOSABS      DIAGNOSTIC TESTING:     Mri Enterography    Result Date: 4/13/2021  EXAMINATION: MRI OF THE ENTEROGRAPHY WITHOUT AND WITH CONTRAST, 4/13/2021 kg/m². Physical Exam  Vitals signs and nursing note reviewed. Constitutional:       General: He is not in acute distress. Appearance: He is well-developed. He is not diaphoretic. HENT:      Head: Normocephalic and atraumatic. Eyes:      General: No scleral icterus. Pupils: Pupils are equal, round, and reactive to light. Neck:      Musculoskeletal: Normal range of motion and neck supple. Thyroid: No thyromegaly. Vascular: No JVD. Trachea: No tracheal deviation. Cardiovascular:      Rate and Rhythm: Normal rate and regular rhythm. Heart sounds: Normal heart sounds. No murmur. Pulmonary:      Effort: Pulmonary effort is normal. No respiratory distress. Breath sounds: Normal breath sounds. No wheezing. Abdominal:      General: Bowel sounds are normal. There is no distension. Palpations: Abdomen is soft. There is no mass. Tenderness: There is no abdominal tenderness. There is no guarding or rebound. Musculoskeletal: Normal range of motion. General: No tenderness. Skin:     General: Skin is warm. Coloration: Skin is not pale. Findings: No erythema or rash. Comments: He is not diaphoretic   Neurological:      Mental Status: He is alert and oriented to person, place, and time. Deep Tendon Reflexes: Reflexes are normal and symmetric. Psychiatric:         Behavior: Behavior normal.         Thought Content: Thought content normal.         Judgment: Judgment normal.           IMPRESSION: Mr. Vicente Wiggins is a 21 y.o. male with      Diagnosis Orders   1. Perianal abscess     2. IBD (inflammatory bowel disease)     3.  Colitis       Will start Remicade as a trial to see if that will improve his symptoms otherwise will refer him to IBD surgery  For now is doing okay with waiting on the TB test, will try to start Remicade in the next couple days  Because the patient has fistulas and abscesses I think we need to go directly to Remicade    Diet/life style/natural hx /complication of the dx were all explained in details   Past medical, past surgical, social history, psychiatric history, medications or allergies, all reviewed and  updated    Thank you for allowing me to participate in the care of Mr. Jasbir Torres. For any further questions please do not hesitate to contact me. I have reviewed and agree with the ROS entered by the MA/RN. Note is dictated utilizing voice recognition software. Unfortunately this leads to occasional typographical errors. Please contact our office if you have any questions.       Maureen Sanchez MD  [facility]  O: #[phone]

## 2021-04-14 LAB
QUANTI TB GOLD PLUS: NEGATIVE
QUANTI TB1 MINUS NIL: 0.01 IU/ML (ref 0–0.34)
QUANTI TB2 MINUS NIL: 0 IU/ML (ref 0–0.34)
QUANTIFERON MITOGEN: 7.65 IU/ML
QUANTIFERON NIL: 0.03 IU/ML

## 2021-04-14 NOTE — TELEPHONE ENCOUNTER
Message is sent to [address] [place] [address] to inquire about changing infusion to STAT with PA. Message also sent to Lida Flores at [facility] to find out about scheduling. Waiting for response.

## 2021-04-14 NOTE — TELEPHONE ENCOUNTER
Message from Kaleigh Yang 5:  [4/14/2021 10:39 AM]  Melissa Dean:    [id number] Mey Landaverde I just hung up, I requested an urgent review. Their turn around time is 1 business day but I told them the MD was willing to do a peer to peer today if needed. Since he submitted it as urgent he suggested I call back in an hour or so after the nurse has had time to review.

## 2021-04-15 ENCOUNTER — TELEPHONE (OUTPATIENT)
Dept: INFUSION THERAPY | Facility: MEDICAL CENTER | Age: 21
End: 2021-04-15

## 2021-04-20 ENCOUNTER — HOSPITAL ENCOUNTER (OUTPATIENT)
Dept: INFUSION THERAPY | Facility: MEDICAL CENTER | Age: 21
Discharge: HOME | End: 2021-04-20
Payer: MEDICARE

## 2021-04-20 VITALS
SYSTOLIC BLOOD PRESSURE: 123 MMHG | BODY MASS INDEX: 20.32 KG/M2 | HEART RATE: 69 BPM | RESPIRATION RATE: 18 BRPM | TEMPERATURE: 97.9 F | DIASTOLIC BLOOD PRESSURE: 68 MMHG | WEIGHT: 141.6 LBS

## 2021-04-20 DIAGNOSIS — K52.9 IBD (INFLAMMATORY BOWEL DISEASE): Primary | ICD-10-CM

## 2021-04-20 PROCEDURE — 6370000000 HC RX 637 (ALT 250 FOR IP): Performed by: INTERNAL MEDICINE

## 2021-04-20 PROCEDURE — 96415 CHEMO IV INFUSION ADDL HR: CPT

## 2021-04-20 PROCEDURE — 6360000002 HC RX W HCPCS: Performed by: INTERNAL MEDICINE

## 2021-04-20 PROCEDURE — 2580000003 HC RX 258: Performed by: INTERNAL MEDICINE

## 2021-04-20 PROCEDURE — 96413 CHEMO IV INFUSION 1 HR: CPT

## 2021-04-20 RX ORDER — SODIUM CHLORIDE 9 MG/ML
INJECTION, SOLUTION INTRAVENOUS ONCE
Status: CANCELLED | OUTPATIENT
Start: 2021-05-04 | End: 2021-05-04

## 2021-04-20 RX ORDER — SODIUM CHLORIDE 9 MG/ML
INJECTION, SOLUTION INTRAVENOUS ONCE
Status: COMPLETED | OUTPATIENT
Start: 2021-04-20 | End: 2021-04-20

## 2021-04-20 RX ORDER — ACETAMINOPHEN 325 MG/1
650 TABLET ORAL ONCE
Status: CANCELLED | OUTPATIENT
Start: 2021-05-04

## 2021-04-20 RX ORDER — DIPHENHYDRAMINE HCL 25 MG
25 TABLET ORAL ONCE
Status: CANCELLED | OUTPATIENT
Start: 2021-05-04

## 2021-04-20 RX ORDER — DIPHENHYDRAMINE HYDROCHLORIDE 50 MG/ML
50 INJECTION INTRAMUSCULAR; INTRAVENOUS ONCE
Status: CANCELLED | OUTPATIENT
Start: 2021-05-04 | End: 2021-05-04

## 2021-04-20 RX ORDER — ACETAMINOPHEN 325 MG/1
650 TABLET ORAL ONCE
Status: COMPLETED | OUTPATIENT
Start: 2021-04-20 | End: 2021-04-20

## 2021-04-20 RX ORDER — METHYLPREDNISOLONE SODIUM SUCCINATE 125 MG/2ML
125 INJECTION, POWDER, LYOPHILIZED, FOR SOLUTION INTRAMUSCULAR; INTRAVENOUS ONCE
Status: CANCELLED | OUTPATIENT
Start: 2021-05-04 | End: 2021-05-04

## 2021-04-20 RX ORDER — SODIUM CHLORIDE 9 MG/ML
INJECTION, SOLUTION INTRAVENOUS CONTINUOUS
Status: CANCELLED | OUTPATIENT
Start: 2021-05-04

## 2021-04-20 RX ORDER — DIPHENHYDRAMINE HCL 25 MG
25 TABLET ORAL ONCE
Status: COMPLETED | OUTPATIENT
Start: 2021-04-20 | End: 2021-04-20

## 2021-04-20 RX ORDER — SODIUM CHLORIDE 9 MG/ML
25 INJECTION, SOLUTION INTRAVENOUS PRN
Status: CANCELLED | OUTPATIENT
Start: 2021-05-04

## 2021-04-20 RX ORDER — HEPARIN SODIUM (PORCINE) LOCK FLUSH IV SOLN 100 UNIT/ML 100 UNIT/ML
500 SOLUTION INTRAVENOUS PRN
Status: CANCELLED | OUTPATIENT
Start: 2021-05-04

## 2021-04-20 RX ORDER — SODIUM CHLORIDE 0.9 % (FLUSH) 0.9 %
5-40 SYRINGE (ML) INJECTION PRN
Status: CANCELLED | OUTPATIENT
Start: 2021-05-04

## 2021-04-20 RX ADMIN — SODIUM CHLORIDE: 9 INJECTION, SOLUTION INTRAVENOUS at 08:39

## 2021-04-20 RX ADMIN — SODIUM CHLORIDE 300 MG: 9 INJECTION, SOLUTION INTRAVENOUS at 09:23

## 2021-04-20 RX ADMIN — DIPHENHYDRAMINE HYDROCHLORIDE 25 MG: 25 TABLET ORAL at 08:39

## 2021-04-20 RX ADMIN — ACETAMINOPHEN 650 MG: 325 TABLET ORAL at 08:39

## 2021-04-20 ASSESSMENT — PAIN DESCRIPTION - DESCRIPTORS: DESCRIPTORS: ACHING;CRAMPING

## 2021-04-20 ASSESSMENT — PAIN SCALES - GENERAL: PAINLEVEL_OUTOF10: 1

## 2021-04-20 ASSESSMENT — PAIN DESCRIPTION - PAIN TYPE: TYPE: CHRONIC PAIN

## 2021-04-20 ASSESSMENT — PAIN DESCRIPTION - LOCATION: LOCATION: ABDOMEN

## 2021-04-20 NOTE — PROGRESS NOTES
RACHEL ARRIVES VIA AMBULATORY FOR FIRST INFUSION OF David Deeds. PERIPHERAL IV BEGAN AND SALINE PRIME TUBING CONNECTED. PLEASE REFER TO MAR FOR PRE MEDS. WAIT 30 MINUTES. RACHEL IS VERY NERVOUS WITH LOTS OF QUESTIONS. SUPPORT IS OFFERED AND QUESTIONS ARE ANSWERED. AWAIT REMICADE. INFUSE AT;   20 ML/HR X15 MIN  109/ 68  71  40 ML/HR X 15 MIN   113/66  69  60 ML/HR X 15 MIN  114/71  64  80 ML/ HR X 15 MIN  108/68  60  125 ML/HR X 15 MIN  110/60   62  125 ML/HR X 15 /64  71   INFUSE  ML FOR REMAINDER OF INFUSION. OBSERVE 30 MIN  POST B/P= 125/70  72    IV CATHLON REMOVED WITH TIP INTACT. DRESSING SECURED. D/C VIA AMBULATORY TO CLERICAL DESK FOR APPOINTMENTS.

## 2021-05-04 ENCOUNTER — HOSPITAL ENCOUNTER (OUTPATIENT)
Dept: INFUSION THERAPY | Facility: MEDICAL CENTER | Age: 21
End: 2021-05-04
Payer: MEDICARE

## 2021-05-05 ENCOUNTER — HOSPITAL ENCOUNTER (OUTPATIENT)
Dept: INFUSION THERAPY | Facility: MEDICAL CENTER | Age: 21
Discharge: HOME | End: 2021-05-05
Payer: MEDICARE

## 2021-05-05 VITALS
HEART RATE: 58 BPM | DIASTOLIC BLOOD PRESSURE: 67 MMHG | RESPIRATION RATE: 20 BRPM | TEMPERATURE: 97.9 F | SYSTOLIC BLOOD PRESSURE: 114 MMHG

## 2021-05-05 DIAGNOSIS — K52.9 IBD (INFLAMMATORY BOWEL DISEASE): Primary | ICD-10-CM

## 2021-05-05 PROCEDURE — 6370000000 HC RX 637 (ALT 250 FOR IP): Performed by: INTERNAL MEDICINE

## 2021-05-05 PROCEDURE — 6360000002 HC RX W HCPCS: Performed by: INTERNAL MEDICINE

## 2021-05-05 PROCEDURE — 96415 CHEMO IV INFUSION ADDL HR: CPT

## 2021-05-05 PROCEDURE — 2580000003 HC RX 258: Performed by: INTERNAL MEDICINE

## 2021-05-05 PROCEDURE — 96413 CHEMO IV INFUSION 1 HR: CPT

## 2021-05-05 RX ORDER — SODIUM CHLORIDE 9 MG/ML
INJECTION, SOLUTION INTRAVENOUS ONCE
Status: COMPLETED | OUTPATIENT
Start: 2021-05-05 | End: 2021-05-05

## 2021-05-05 RX ORDER — SODIUM CHLORIDE 9 MG/ML
INJECTION, SOLUTION INTRAVENOUS ONCE
Status: CANCELLED | OUTPATIENT
Start: 2021-06-01 | End: 2021-06-01

## 2021-05-05 RX ORDER — DIPHENHYDRAMINE HCL 25 MG
25 TABLET ORAL ONCE
Status: CANCELLED | OUTPATIENT
Start: 2021-06-01

## 2021-05-05 RX ORDER — SODIUM CHLORIDE 0.9 % (FLUSH) 0.9 %
5-40 SYRINGE (ML) INJECTION PRN
Status: CANCELLED | OUTPATIENT
Start: 2021-06-01

## 2021-05-05 RX ORDER — SODIUM CHLORIDE 9 MG/ML
INJECTION, SOLUTION INTRAVENOUS CONTINUOUS
Status: CANCELLED | OUTPATIENT
Start: 2021-06-01

## 2021-05-05 RX ORDER — METHYLPREDNISOLONE SODIUM SUCCINATE 125 MG/2ML
125 INJECTION, POWDER, LYOPHILIZED, FOR SOLUTION INTRAMUSCULAR; INTRAVENOUS ONCE
Status: CANCELLED | OUTPATIENT
Start: 2021-06-01 | End: 2021-06-01

## 2021-05-05 RX ORDER — DIPHENHYDRAMINE HCL 25 MG
25 TABLET ORAL ONCE
Status: COMPLETED | OUTPATIENT
Start: 2021-05-05 | End: 2021-05-05

## 2021-05-05 RX ORDER — DIPHENHYDRAMINE HYDROCHLORIDE 50 MG/ML
50 INJECTION INTRAMUSCULAR; INTRAVENOUS ONCE
Status: CANCELLED | OUTPATIENT
Start: 2021-06-01 | End: 2021-06-01

## 2021-05-05 RX ORDER — ACETAMINOPHEN 325 MG/1
650 TABLET ORAL ONCE
Status: CANCELLED | OUTPATIENT
Start: 2021-06-01

## 2021-05-05 RX ORDER — SODIUM CHLORIDE 9 MG/ML
25 INJECTION, SOLUTION INTRAVENOUS PRN
Status: CANCELLED | OUTPATIENT
Start: 2021-06-01

## 2021-05-05 RX ORDER — ACETAMINOPHEN 325 MG/1
650 TABLET ORAL ONCE
Status: COMPLETED | OUTPATIENT
Start: 2021-05-05 | End: 2021-05-05

## 2021-05-05 RX ORDER — HEPARIN SODIUM (PORCINE) LOCK FLUSH IV SOLN 100 UNIT/ML 100 UNIT/ML
500 SOLUTION INTRAVENOUS PRN
Status: CANCELLED | OUTPATIENT
Start: 2021-06-01

## 2021-05-05 RX ADMIN — ACETAMINOPHEN 650 MG: 325 TABLET ORAL at 08:39

## 2021-05-05 RX ADMIN — SODIUM CHLORIDE: 9 INJECTION, SOLUTION INTRAVENOUS at 08:40

## 2021-05-05 RX ADMIN — DIPHENHYDRAMINE HYDROCHLORIDE 25 MG: 25 TABLET ORAL at 08:39

## 2021-05-05 RX ADMIN — SODIUM CHLORIDE 300 MG: 9 INJECTION, SOLUTION INTRAVENOUS at 09:11

## 2021-05-05 ASSESSMENT — PAIN SCALES - GENERAL: PAINLEVEL_OUTOF10: 0

## 2021-05-05 NOTE — PROGRESS NOTES
Patient here for Renflexis infusion. Feels well today. No problems with last infusion. Vitals obtained. IV started. Premeds given per [facility]. Renflexis hung per MAR. Started at 10 ml/hr x 15 minutes until max rate of 250 ml/hr reached over 2 hours. Vitals obtained and placed on flowsheet. Completed. Tolerated well. IV discontinued intact, dressing to site. Has a return visit scheduled. Discharged ambulatory per self.

## 2021-06-16 ENCOUNTER — HOSPITAL ENCOUNTER (OUTPATIENT)
Dept: INFUSION THERAPY | Facility: MEDICAL CENTER | Age: 21
Discharge: HOME | End: 2021-06-16
Payer: MEDICARE

## 2021-06-16 VITALS
RESPIRATION RATE: 16 BRPM | SYSTOLIC BLOOD PRESSURE: 92 MMHG | TEMPERATURE: 97.9 F | HEART RATE: 75 BPM | DIASTOLIC BLOOD PRESSURE: 63 MMHG

## 2021-06-16 DIAGNOSIS — K52.9 IBD (INFLAMMATORY BOWEL DISEASE): Primary | ICD-10-CM

## 2021-06-16 PROCEDURE — 6360000002 HC RX W HCPCS: Performed by: INTERNAL MEDICINE

## 2021-06-16 PROCEDURE — 2580000003 HC RX 258: Performed by: INTERNAL MEDICINE

## 2021-06-16 PROCEDURE — 96413 CHEMO IV INFUSION 1 HR: CPT

## 2021-06-16 PROCEDURE — 6370000000 HC RX 637 (ALT 250 FOR IP): Performed by: INTERNAL MEDICINE

## 2021-06-16 PROCEDURE — 96415 CHEMO IV INFUSION ADDL HR: CPT

## 2021-06-16 RX ORDER — DIPHENHYDRAMINE HCL 25 MG
25 TABLET ORAL ONCE
Status: COMPLETED | OUTPATIENT
Start: 2021-06-16 | End: 2021-06-16

## 2021-06-16 RX ORDER — DIPHENHYDRAMINE HCL 25 MG
25 TABLET ORAL ONCE
Status: CANCELLED | OUTPATIENT
Start: 2021-07-27

## 2021-06-16 RX ORDER — SODIUM CHLORIDE 9 MG/ML
INJECTION, SOLUTION INTRAVENOUS ONCE
Status: COMPLETED | OUTPATIENT
Start: 2021-06-16 | End: 2021-06-16

## 2021-06-16 RX ORDER — HEPARIN SODIUM (PORCINE) LOCK FLUSH IV SOLN 100 UNIT/ML 100 UNIT/ML
500 SOLUTION INTRAVENOUS PRN
Status: CANCELLED | OUTPATIENT
Start: 2021-07-27

## 2021-06-16 RX ORDER — SODIUM CHLORIDE 9 MG/ML
INJECTION, SOLUTION INTRAVENOUS ONCE
Status: CANCELLED | OUTPATIENT
Start: 2021-07-27 | End: 2021-07-27

## 2021-06-16 RX ORDER — SODIUM CHLORIDE 9 MG/ML
25 INJECTION, SOLUTION INTRAVENOUS PRN
Status: CANCELLED | OUTPATIENT
Start: 2021-07-27

## 2021-06-16 RX ORDER — ACETAMINOPHEN 325 MG/1
650 TABLET ORAL ONCE
Status: CANCELLED | OUTPATIENT
Start: 2021-07-27

## 2021-06-16 RX ORDER — METHYLPREDNISOLONE SODIUM SUCCINATE 125 MG/2ML
125 INJECTION, POWDER, LYOPHILIZED, FOR SOLUTION INTRAMUSCULAR; INTRAVENOUS ONCE
Status: CANCELLED | OUTPATIENT
Start: 2021-07-27 | End: 2021-07-27

## 2021-06-16 RX ORDER — SODIUM CHLORIDE 9 MG/ML
INJECTION, SOLUTION INTRAVENOUS CONTINUOUS
Status: CANCELLED | OUTPATIENT
Start: 2021-07-27

## 2021-06-16 RX ORDER — DIPHENHYDRAMINE HYDROCHLORIDE 50 MG/ML
50 INJECTION INTRAMUSCULAR; INTRAVENOUS ONCE
Status: CANCELLED | OUTPATIENT
Start: 2021-07-27 | End: 2021-07-27

## 2021-06-16 RX ORDER — SODIUM CHLORIDE 0.9 % (FLUSH) 0.9 %
5-40 SYRINGE (ML) INJECTION PRN
Status: CANCELLED | OUTPATIENT
Start: 2021-07-27

## 2021-06-16 RX ORDER — ACETAMINOPHEN 325 MG/1
650 TABLET ORAL ONCE
Status: COMPLETED | OUTPATIENT
Start: 2021-06-16 | End: 2021-06-16

## 2021-06-16 RX ADMIN — SODIUM CHLORIDE 300 MG: 9 INJECTION, SOLUTION INTRAVENOUS at 09:03

## 2021-06-16 RX ADMIN — SODIUM CHLORIDE: 9 INJECTION, SOLUTION INTRAVENOUS at 08:20

## 2021-06-16 RX ADMIN — ACETAMINOPHEN 650 MG: 325 TABLET ORAL at 08:37

## 2021-06-16 RX ADMIN — DIPHENHYDRAMINE HYDROCHLORIDE 25 MG: 25 TABLET ORAL at 08:37

## 2021-06-16 ASSESSMENT — PAIN SCALES - GENERAL: PAINLEVEL_OUTOF10: 0

## 2021-06-16 NOTE — PROGRESS NOTES
Patient arrived ambulatory per self for Renflexis infusion. No complaints or concerns. Denies any infections. No problems with last infusion. Vitals obtained. IV started per policy. Premeds given per [facility]. Renflexis hung per MAR. Started at 10 ml/hr x 15 minutes until max rate of 250 ml/hr reached over 2 hours. Vitals obtained and placed on flowsheet. Completed. Tolerated well. Line flushed. IV discontinued with pressure dressing applied. Patient ambulated off unit per self at discharge.

## 2021-06-16 NOTE — PLAN OF CARE
Problem: SAFETY  Goal: Free from accidental physical injury  Reactivated  Goal: Free from intentional harm  Reactivated

## 2021-07-08 ENCOUNTER — TELEPHONE (OUTPATIENT)
Dept: GASTROENTEROLOGY | Age: 21
End: 2021-07-08

## 2021-07-15 NOTE — TELEPHONE ENCOUNTER
PT called in thinking he missed an appt on 7/8, writer adv pt he did not have an appt scheduled. The referral was sent on 7/8, pt asked why when he hasn't been seen yet. Pt stated he lvm for Rajani a few minutes ago, writer adv pt MA is in clinic but will also send a note back requesting for a call back.     #[phone]

## 2021-07-16 NOTE — TELEPHONE ENCOUNTER
Patient did LM stating he thought he was going to meet with Dr Jason Marcos again before being sent to [facility]. Writer returned call to patient. It is explained to him that Dr Jason Marcos wanted him started there sooner. Patient can postpone visit there if he wishes. He and  can discuss this at appt next week.

## 2021-07-20 ENCOUNTER — OFFICE VISIT (OUTPATIENT)
Dept: GASTROENTEROLOGY | Age: 21
End: 2021-07-20
Payer: MEDICARE

## 2021-07-20 VITALS
BODY MASS INDEX: 21.09 KG/M2 | DIASTOLIC BLOOD PRESSURE: 60 MMHG | TEMPERATURE: 97.3 F | WEIGHT: 147 LBS | SYSTOLIC BLOOD PRESSURE: 113 MMHG | HEART RATE: 72 BPM

## 2021-07-20 DIAGNOSIS — K52.9 IBD (INFLAMMATORY BOWEL DISEASE): Primary | ICD-10-CM

## 2021-07-20 DIAGNOSIS — K61.0 PERIANAL ABSCESS: ICD-10-CM

## 2021-07-20 DIAGNOSIS — K52.9 COLITIS: ICD-10-CM

## 2021-07-20 PROCEDURE — 4004F PT TOBACCO SCREEN RCVD TLK: CPT | Performed by: INTERNAL MEDICINE

## 2021-07-20 PROCEDURE — 99214 OFFICE O/P EST MOD 30 MIN: CPT | Performed by: INTERNAL MEDICINE

## 2021-07-20 PROCEDURE — G8420 CALC BMI NORM PARAMETERS: HCPCS | Performed by: INTERNAL MEDICINE

## 2021-07-20 PROCEDURE — G8427 DOCREV CUR MEDS BY ELIG CLIN: HCPCS | Performed by: INTERNAL MEDICINE

## 2021-07-20 RX ORDER — INFLIXIMAB 100 MG/10ML
5 INJECTION, POWDER, LYOPHILIZED, FOR SOLUTION INTRAVENOUS SEE ADMIN INSTRUCTIONS
COMMUNITY

## 2021-07-20 ASSESSMENT — ENCOUNTER SYMPTOMS
ABDOMINAL DISTENTION: 0
NAUSEA: 0
CHOKING: 0
ABDOMINAL PAIN: 0
WHEEZING: 0
CONSTIPATION: 0
ANAL BLEEDING: 0
TROUBLE SWALLOWING: 0
DIARRHEA: 0
BLOOD IN STOOL: 0
COUGH: 0
VOMITING: 0
RECTAL PAIN: 0

## 2021-07-20 NOTE — PROGRESS NOTES
GI CLINIC FOLLOW UP    INTERVAL HISTORY:   No referring provider defined for this encounter. Chief Complaint   Patient presents with    Crohn's Disease     Patient is f/u on Crohn's. He states since starting the Remicade, he has been feeling much better. He wants to discuss the [facility] referral before scheduling appt.  Other     Patient states he still feels like there may be a small fissure and maybe the fistula is still there to the left. HISTORY OF PRESENT ILLNESS: Basia Culver is a 24 y.o. male , referred for evaluation of*Crohn's disease with perianal abscesses and fistulas      Patient is here for follow-up he said he is doing very well  Not feeling any drainage not feeling any perianal irritation or pain not having any bleeding not having any cramps    Eating and drinking very well  Gaining weight  Is on Remicade  Tolerating the treatment very well          Media Information     Document Information    O.P. Photos      04/01/2021 11:36   Attached To: Hospital Encounter on 4/1/21   Source Information    MD Teresa Diaz           Past Medical,Family, and Social History reviewed and does contribute to the patient presentingcondition. Patient's PMH/PSH,SH,PSYCH Hx, MEDs, ALLERGIES, and ROS were all reviewed and updated in the appropriate sections.     PAST MEDICAL HISTORY:  Past Medical History:   Diagnosis Date    Anal fissure     Colitis     Meagan-rectal abscess     Rectal pain        Past Surgical History:   Procedure Laterality Date    COLONOSCOPY  03/08/2021    COLONOSCOPY N/A 4/1/2021    COLONOSCOPY WITH BIOPSY performed by Yary Cardona MD at [address] [place] [address]  2006       CURRENT MEDICATIONS:    Current Outpatient Medications:     inFLIXimab (REMICADE) 100 MG injection, Infuse 5 mg/kg intravenously See Admin Instructions, Disp: , Rfl:     polyethylene glycol (GLYCOLAX) 17 g packet, Take 17 g by mouth daily as needed for Constipation, Disp: , Rfl:     acetaminophen (TYLENOL) 500 MG tablet, Take 500 mg by mouth every 6 hours as needed for Pain, Disp: , Rfl:     Psyllium (METAMUCIL FIBER PO), Take by mouth, Disp: , Rfl:     Hydrocort-Pramoxine, Perianal, (ANALPRAM-HC) 2.5-1 % rectal cream, Place rectally 3 times daily, Disp: , Rfl:     lidocaine (XYLOCAINE) 5 % ointment, Apply topically as needed for Pain Apply topically as needed. , Disp: , Rfl:     ALLERGIES:   No Known Allergies    FAMILY HISTORY:       Problem Relation Age of Onset    Irritable Bowel Syndrome Mother     Diabetes Father          SOCIAL HISTORY:   Social History     Socioeconomic History    Marital status: Single     Spouse name: Not on file    Number of children: Not on file    Years of education: Not on file    Highest education level: Not on file   Occupational History    Not on file   Tobacco Use    Smoking status: Current Some Day Smoker    Smokeless tobacco: Never Used   Vaping Use    Vaping Use: Some days   Substance and Sexual Activity    Alcohol use: Yes     Comment: occasional    Drug use: Never    Sexual activity: Not Currently   Other Topics Concern    Not on file   Social History Narrative    Not on file     Social Determinants of Health     Financial Resource Strain:     Difficulty of Paying Living Expenses:    Food Insecurity:     Worried About Running Out of Food in the Last Year:     Ran Out of Food in the Last Year:    Transportation Needs:     Lack of Transportation (Medical):      Lack of Transportation (Non-Medical):    Physical Activity:     Days of Exercise per Week:     Minutes of Exercise per Session:    Stress:     Feeling of Stress :    Social Connections:     Frequency of Communication with Friends and Family:     Frequency of Social Gatherings with Friends and Family:     Attends Gnosticism Services:     Active Member of Clubs or Organizations:     Attends Club or Organization Meetings:     Marital Status:    Intimate Partner Head: Normocephalic and atraumatic. Eyes:      General: No scleral icterus. Pupils: Pupils are equal, round, and reactive to light. Neck:      Thyroid: No thyromegaly. Vascular: No JVD. Trachea: No tracheal deviation. Cardiovascular:      Rate and Rhythm: Normal rate and regular rhythm. Heart sounds: Normal heart sounds. No murmur heard. Pulmonary:      Effort: Pulmonary effort is normal. No respiratory distress. Breath sounds: Normal breath sounds. No wheezing. Abdominal:      General: Bowel sounds are normal. There is no distension. Palpations: Abdomen is soft. There is no mass. Tenderness: There is no abdominal tenderness. There is no guarding or rebound. Musculoskeletal:         General: No tenderness. Normal range of motion. Cervical back: Normal range of motion and neck supple. Skin:     General: Skin is warm. Coloration: Skin is not pale. Findings: No erythema or rash. Comments: He is not diaphoretic   Neurological:      Mental Status: He is alert and oriented to person, place, and time. Deep Tendon Reflexes: Reflexes are normal and symmetric. Psychiatric:         Behavior: Behavior normal.         Thought Content: Thought content normal.         Judgment: Judgment normal.           IMPRESSION: Mr. Carmen Tim is a 24 y.o. male with      Diagnosis Orders   1. IBD (inflammatory bowel disease)  Endoscopy, sigmoid   2. Colitis  Endoscopy, sigmoid   3. Perianal abscess  Endoscopy, sigmoid     He is scheduled with the [facility] clinic for surgical opinion on the perianal fistulas and abscesses at his age.   Continue with Remicade for now I told him would probably treat him for 1 or 2 years then switch him to something else  Go ahead and evaluate the perianal disease again with the flex sig    Diet/life style/natural hx /complication of the dx were all explained in details   Past medical, past surgical, social history, psychiatric history, medications or allergies, all reviewed and  updated    Thank you for allowing me to participate in the care of Mr. Ronald Qiu. For any further questions please do not hesitate to contact me. I have reviewed and agree with the ROS entered by the MA/RN. Note is dictated utilizing voice recognition software. Unfortunately this leads to occasional typographical errors. Please contact our office if you have any questions.       Gui Larry MD  [facility]  O: #[phone]

## 2021-07-27 NOTE — TELEPHONE ENCOUNTER
Patient is scheduled at [facility] with Dr. Heredia Days 8/12/21. Appointment confirmation from [facility] received.

## 2021-07-30 NOTE — TELEPHONE ENCOUNTER
Writer attempted to reach pt by phone in regards to rescheduling 8/13/21 Flex Sig proc, but there was no answer and vm is full; unable to leave msg. Sent msg in pt's MyChart. We will also attempt to call pt again.

## 2021-07-30 NOTE — TELEPHONE ENCOUNTER
Pt called in stating he missed a call from our office and wanted to know what we were calling about. Writer informed pt we need to resched his 8/13/21 proc d/t prov is not here. Pt is resched w/ Sahil at [address] [place] [address] 8/5/21 @ 1pm proc time, 11am arrival time. Pt reminded of needing a  and bring proof of covid-19 vaccinations. Pt instructed where to report. Prep instructions were updated w/ pt over the phone. Pt voices his understanding.

## 2021-08-02 NOTE — TELEPHONE ENCOUNTER
Writer left msg on pt's vm informing him he is sched for a PAT phone on 8/4/21 @ 10:45am. He will receive a call from surgery nurse to go over pre-testing questions.

## 2021-08-04 ENCOUNTER — HOSPITAL ENCOUNTER (OUTPATIENT)
Dept: PREADMISSION TESTING | Age: 21
Setting detail: OUTPATIENT SURGERY
Discharge: HOME | End: 2021-08-08
Payer: MEDICARE

## 2021-08-04 ENCOUNTER — ANESTHESIA EVENT (OUTPATIENT)
Dept: ENDOSCOPY | Age: 21
End: 2021-08-04
Payer: MEDICARE

## 2021-08-04 VITALS — BODY MASS INDEX: 21.05 KG/M2 | WEIGHT: 147 LBS | HEIGHT: 70 IN

## 2021-08-04 NOTE — TELEPHONE ENCOUNTER
Writer left msg on pt's vm asking pt to arrive at 11:45am tomorrow 8/5/21 and proc will be at 1:45pm.

## 2021-08-04 NOTE — TELEPHONE ENCOUNTER
Pt left msg on ofc vm 8/4/21 @ 11:34am stating he needs to know what time he should do his Fleets Enemas for his proc tomorrow, 8/5/21. Also wants to know what time he should arrive at [facility]. Writer returned pt's phone call and spoke to pt over the phone informing pt he should arrive at [facility] tomorrow 8/5/21 @ 11am and report to [facility] Surgery Entrance. Writer instructed pt to do first enema at 8am tomorrow 8/5/21 and second enema at 9am.  Pt voices his understanding.

## 2021-08-04 NOTE — PROGRESS NOTES

## 2021-08-05 ENCOUNTER — TELEPHONE (OUTPATIENT)
Dept: GASTROENTEROLOGY | Age: 21
End: 2021-08-05

## 2021-08-05 ENCOUNTER — ANESTHESIA (OUTPATIENT)
Dept: ENDOSCOPY | Age: 21
End: 2021-08-05
Payer: MEDICARE

## 2021-08-05 ENCOUNTER — HOSPITAL ENCOUNTER (OUTPATIENT)
Age: 21
Setting detail: OUTPATIENT SURGERY
Discharge: HOME | End: 2021-08-05
Attending: INTERNAL MEDICINE | Admitting: INTERNAL MEDICINE
Payer: MEDICARE

## 2021-08-05 VITALS — SYSTOLIC BLOOD PRESSURE: 84 MMHG | DIASTOLIC BLOOD PRESSURE: 33 MMHG | OXYGEN SATURATION: 98 %

## 2021-08-05 VITALS
TEMPERATURE: 97.3 F | WEIGHT: 147 LBS | DIASTOLIC BLOOD PRESSURE: 66 MMHG | OXYGEN SATURATION: 100 % | HEIGHT: 70 IN | RESPIRATION RATE: 20 BRPM | BODY MASS INDEX: 21.05 KG/M2 | HEART RATE: 65 BPM | SYSTOLIC BLOOD PRESSURE: 121 MMHG

## 2021-08-05 PROCEDURE — 3700000001 HC ADD 15 MINUTES (ANESTHESIA): Performed by: INTERNAL MEDICINE

## 2021-08-05 PROCEDURE — 7100000000 HC PACU RECOVERY - FIRST 15 MIN: Performed by: INTERNAL MEDICINE

## 2021-08-05 PROCEDURE — 2580000003 HC RX 258: Performed by: ANESTHESIOLOGY

## 2021-08-05 PROCEDURE — 45330 DIAGNOSTIC SIGMOIDOSCOPY: CPT | Performed by: INTERNAL MEDICINE

## 2021-08-05 PROCEDURE — 7100000001 HC PACU RECOVERY - ADDTL 15 MIN: Performed by: INTERNAL MEDICINE

## 2021-08-05 PROCEDURE — 7100000010 HC PHASE II RECOVERY - FIRST 15 MIN: Performed by: INTERNAL MEDICINE

## 2021-08-05 PROCEDURE — 7100000031 HC ASPR PHASE II RECOVERY - ADDTL 15 MIN: Performed by: INTERNAL MEDICINE

## 2021-08-05 PROCEDURE — 2500000003 HC RX 250 WO HCPCS

## 2021-08-05 PROCEDURE — 6360000002 HC RX W HCPCS

## 2021-08-05 PROCEDURE — 7100000030 HC ASPR PHASE II RECOVERY - FIRST 15 MIN: Performed by: INTERNAL MEDICINE

## 2021-08-05 PROCEDURE — 7100000011 HC PHASE II RECOVERY - ADDTL 15 MIN: Performed by: INTERNAL MEDICINE

## 2021-08-05 PROCEDURE — 3609008400 HC SIGMOIDOSCOPY DIAGNOSTIC: Performed by: INTERNAL MEDICINE

## 2021-08-05 PROCEDURE — 3700000000 HC ANESTHESIA ATTENDED CARE: Performed by: INTERNAL MEDICINE

## 2021-08-05 PROCEDURE — 2709999900 HC NON-CHARGEABLE SUPPLY: Performed by: INTERNAL MEDICINE

## 2021-08-05 RX ORDER — ONDANSETRON 2 MG/ML
4 INJECTION INTRAMUSCULAR; INTRAVENOUS
Status: DISCONTINUED | OUTPATIENT
Start: 2021-08-05 | End: 2021-08-05 | Stop reason: HOSPADM

## 2021-08-05 RX ORDER — LABETALOL HYDROCHLORIDE 5 MG/ML
5 INJECTION, SOLUTION INTRAVENOUS EVERY 10 MIN PRN
Status: DISCONTINUED | OUTPATIENT
Start: 2021-08-05 | End: 2021-08-05 | Stop reason: HOSPADM

## 2021-08-05 RX ORDER — MEPERIDINE HYDROCHLORIDE 25 MG/ML
12.5 INJECTION INTRAMUSCULAR; INTRAVENOUS; SUBCUTANEOUS EVERY 5 MIN PRN
Status: DISCONTINUED | OUTPATIENT
Start: 2021-08-05 | End: 2021-08-05 | Stop reason: HOSPADM

## 2021-08-05 RX ORDER — FENTANYL CITRATE 50 UG/ML
25 INJECTION, SOLUTION INTRAMUSCULAR; INTRAVENOUS EVERY 5 MIN PRN
Status: DISCONTINUED | OUTPATIENT
Start: 2021-08-05 | End: 2021-08-05 | Stop reason: HOSPADM

## 2021-08-05 RX ORDER — HYDRALAZINE HYDROCHLORIDE 20 MG/ML
5 INJECTION INTRAMUSCULAR; INTRAVENOUS EVERY 10 MIN PRN
Status: DISCONTINUED | OUTPATIENT
Start: 2021-08-05 | End: 2021-08-05 | Stop reason: HOSPADM

## 2021-08-05 RX ORDER — MORPHINE SULFATE 2 MG/ML
2 INJECTION, SOLUTION INTRAMUSCULAR; INTRAVENOUS EVERY 5 MIN PRN
Status: DISCONTINUED | OUTPATIENT
Start: 2021-08-05 | End: 2021-08-05 | Stop reason: HOSPADM

## 2021-08-05 RX ORDER — PROPOFOL 10 MG/ML
INJECTION, EMULSION INTRAVENOUS PRN
Status: DISCONTINUED | OUTPATIENT
Start: 2021-08-05 | End: 2021-08-05 | Stop reason: SDUPTHER

## 2021-08-05 RX ORDER — SODIUM CHLORIDE 0.9 % (FLUSH) 0.9 %
10 SYRINGE (ML) INJECTION EVERY 12 HOURS SCHEDULED
Status: DISCONTINUED | OUTPATIENT
Start: 2021-08-05 | End: 2021-08-05 | Stop reason: HOSPADM

## 2021-08-05 RX ORDER — METOCLOPRAMIDE HYDROCHLORIDE 5 MG/ML
10 INJECTION INTRAMUSCULAR; INTRAVENOUS
Status: DISCONTINUED | OUTPATIENT
Start: 2021-08-05 | End: 2021-08-05 | Stop reason: HOSPADM

## 2021-08-05 RX ORDER — HYDROCODONE BITARTRATE AND ACETAMINOPHEN 5; 325 MG/1; MG/1
2 TABLET ORAL PRN
Status: DISCONTINUED | OUTPATIENT
Start: 2021-08-05 | End: 2021-08-05 | Stop reason: HOSPADM

## 2021-08-05 RX ORDER — SODIUM CHLORIDE 0.9 % (FLUSH) 0.9 %
10 SYRINGE (ML) INJECTION PRN
Status: DISCONTINUED | OUTPATIENT
Start: 2021-08-05 | End: 2021-08-05 | Stop reason: HOSPADM

## 2021-08-05 RX ORDER — LIDOCAINE HYDROCHLORIDE 10 MG/ML
1 INJECTION, SOLUTION EPIDURAL; INFILTRATION; INTRACAUDAL; PERINEURAL
Status: DISCONTINUED | OUTPATIENT
Start: 2021-08-05 | End: 2021-08-05 | Stop reason: HOSPADM

## 2021-08-05 RX ORDER — FENTANYL CITRATE 50 UG/ML
50 INJECTION, SOLUTION INTRAMUSCULAR; INTRAVENOUS EVERY 5 MIN PRN
Status: DISCONTINUED | OUTPATIENT
Start: 2021-08-05 | End: 2021-08-05 | Stop reason: HOSPADM

## 2021-08-05 RX ORDER — HYDROCODONE BITARTRATE AND ACETAMINOPHEN 5; 325 MG/1; MG/1
1 TABLET ORAL PRN
Status: DISCONTINUED | OUTPATIENT
Start: 2021-08-05 | End: 2021-08-05 | Stop reason: HOSPADM

## 2021-08-05 RX ORDER — SODIUM CHLORIDE 9 MG/ML
25 INJECTION, SOLUTION INTRAVENOUS PRN
Status: DISCONTINUED | OUTPATIENT
Start: 2021-08-05 | End: 2021-08-05 | Stop reason: HOSPADM

## 2021-08-05 RX ORDER — DIPHENHYDRAMINE HYDROCHLORIDE 50 MG/ML
12.5 INJECTION INTRAMUSCULAR; INTRAVENOUS
Status: DISCONTINUED | OUTPATIENT
Start: 2021-08-05 | End: 2021-08-05 | Stop reason: HOSPADM

## 2021-08-05 RX ORDER — LIDOCAINE HYDROCHLORIDE 10 MG/ML
INJECTION, SOLUTION EPIDURAL; INFILTRATION; INTRACAUDAL; PERINEURAL PRN
Status: DISCONTINUED | OUTPATIENT
Start: 2021-08-05 | End: 2021-08-05 | Stop reason: SDUPTHER

## 2021-08-05 RX ORDER — SODIUM CHLORIDE, SODIUM LACTATE, POTASSIUM CHLORIDE, CALCIUM CHLORIDE 600; 310; 30; 20 MG/100ML; MG/100ML; MG/100ML; MG/100ML
INJECTION, SOLUTION INTRAVENOUS CONTINUOUS
Status: DISCONTINUED | OUTPATIENT
Start: 2021-08-05 | End: 2021-08-05 | Stop reason: HOSPADM

## 2021-08-05 RX ADMIN — SODIUM CHLORIDE, POTASSIUM CHLORIDE, SODIUM LACTATE AND CALCIUM CHLORIDE: 600; 310; 30; 20 INJECTION, SOLUTION INTRAVENOUS at 13:22

## 2021-08-05 RX ADMIN — PROPOFOL 30 MG: 10 INJECTION, EMULSION INTRAVENOUS at 15:52

## 2021-08-05 RX ADMIN — PROPOFOL 70 MG: 10 INJECTION, EMULSION INTRAVENOUS at 15:51

## 2021-08-05 RX ADMIN — PROPOFOL 20 MG: 10 INJECTION, EMULSION INTRAVENOUS at 15:56

## 2021-08-05 RX ADMIN — LIDOCAINE HYDROCHLORIDE 50 MG: 10 INJECTION, SOLUTION EPIDURAL; INFILTRATION; INTRACAUDAL; PERINEURAL at 15:51

## 2021-08-05 RX ADMIN — PROPOFOL 30 MG: 10 INJECTION, EMULSION INTRAVENOUS at 15:55

## 2021-08-05 RX ADMIN — PROPOFOL 30 MG: 10 INJECTION, EMULSION INTRAVENOUS at 15:53

## 2021-08-05 RX ADMIN — PROPOFOL 50 MG: 10 INJECTION, EMULSION INTRAVENOUS at 15:54

## 2021-08-05 ASSESSMENT — ENCOUNTER SYMPTOMS
ABDOMINAL PAIN: 1
COUGH: 0
EYES NEGATIVE: 1
CHOKING: 0
VOMITING: 0
RECTAL PAIN: 0
RESPIRATORY NEGATIVE: 1
STRIDOR: 0
NAUSEA: 1
WHEEZING: 0

## 2021-08-05 ASSESSMENT — PAIN SCALES - GENERAL
PAINLEVEL_OUTOF10: 0

## 2021-08-05 ASSESSMENT — PULMONARY FUNCTION TESTS
PIF_VALUE: 0
PIF_VALUE: 1
PIF_VALUE: 0
PIF_VALUE: 1
PIF_VALUE: 0
PIF_VALUE: 1
PIF_VALUE: 0
PIF_VALUE: 1
PIF_VALUE: 0
PIF_VALUE: 0

## 2021-08-05 ASSESSMENT — PAIN DESCRIPTION - DESCRIPTORS: DESCRIPTORS: SORE

## 2021-08-05 ASSESSMENT — PAIN - FUNCTIONAL ASSESSMENT: PAIN_FUNCTIONAL_ASSESSMENT: 0-10

## 2021-08-05 ASSESSMENT — LIFESTYLE VARIABLES: SMOKING_STATUS: 1

## 2021-08-05 NOTE — ANESTHESIA PRE PROCEDURE
Department of Anesthesiology  Preprocedure Note       Name:  Brock Trevizo   Age:  24 y.o.  :  2000                                          MRN:  [id number]         Date:  2021      Surgeon: Bella Mcnair):  Melly Quintero MD    Procedure: Procedure(s):  SIGMOIDOSCOPY DIAGNOSTIC FLEXIBLE    Medications prior to admission:   Prior to Admission medications    Medication Sig Start Date End Date Taking? Authorizing Provider   inFLIXimab (REMICADE) 100 MG injection Infuse 5 mg/kg intravenously See Admin Instructions Every other month    Historical Provider, MD   acetaminophen (TYLENOL) 500 MG tablet Take 500 mg by mouth every 6 hours as needed for Pain    Historical Provider, MD   Hydrocort-Pramoxine, Perianal, (ANALPRAM-HC) 2.5-1 % rectal cream Place rectally 3 times daily as needed     Historical Provider, MD   lidocaine (XYLOCAINE) 5 % ointment Apply topically as needed for Pain Apply topically as needed.     Historical Provider, MD       Current medications:    Current Facility-Administered Medications   Medication Dose Route Frequency Provider Last Rate Last Admin    lactated ringers infusion   Intravenous Continuous Bladimir Nieto MD        sodium chloride flush 0.9 % injection 10 mL  10 mL Intravenous 2 times per day Bladimir Nieto MD        sodium chloride flush 0.9 % injection 10 mL  10 mL Intravenous PRN Bladimir Nieto MD        0.9 % sodium chloride infusion  25 mL Intravenous PRN Bladimir Nieto MD        lidocaine PF 1 % injection 1 mL  1 mL Intradermal Once PRN Bladimir Nieto MD           Allergies:  No Known Allergies    Problem List:    Patient Active Problem List   Diagnosis Code    IBD (inflammatory bowel disease) K52.9       Past Medical History:        Diagnosis Date    Anal fissure     Colitis     Crohn's colitis ([facility] [address]     Meagan-rectal abscess     Rectal pain        Past Surgical History:        Procedure Laterality Date    COLONOSCOPY  2021    COLONOSCOPY N/A 2021 COLONOSCOPY WITH BIOPSY performed by Zahraa Alexander MD at [address] [place] [address] Drive  12/22/2020    rectal abcess excision, Dr. Ryan Valle  2006       Social History:    Social History     Tobacco Use    Smoking status: Current Some Day Smoker    Smokeless tobacco: Never Used    Tobacco comment: 1-2 cigarettes per month   Substance Use Topics    Alcohol use: Yes     Comment: occasional                                Ready to quit: Not Answered  Counseling given: Not Answered  Comment: 1-2 cigarettes per month      Vital Signs (Current): There were no vitals filed for this visit. BP Readings from Last 3 Encounters:   07/20/21 113/60   06/16/21 92/63   05/05/21 114/67       NPO Status:                                                                                 BMI:   Wt Readings from Last 3 Encounters:   08/04/21 147 lb (66.7 kg)   07/20/21 147 lb (66.7 kg)   04/20/21 141 lb 9.6 oz (64.2 kg)     There is no height or weight on file to calculate BMI.    CBC: No results found for: WBC, RBC, HGB, HCT, MCV, RDW, PLT    CMP: No results found for: NA, K, CL, CO2, BUN, CREATININE, GFRAA, AGRATIO, LABGLOM, GLUCOSE, PROT, CALCIUM, BILITOT, ALKPHOS, AST, ALT    POC Tests: No results for input(s): POCGLU, POCNA, POCK, POCCL, POCBUN, POCHEMO, POCHCT in the last 72 hours.     Coags: No results found for: PROTIME, INR, APTT    HCG (If Applicable): No results found for: PREGTESTUR, PREGSERUM, HCG, HCGQUANT     ABGs: No results found for: PHART, PO2ART, UFA6RIC, PFA0DFZ, BEART, Z2FDPDIY     Type & Screen (If Applicable):  No results found for: LABABO, LABRH    Drug/Infectious Status (If Applicable):  Lab Results   Component Value Date    HEPCAB NONREACTIVE 04/12/2021       COVID-19 Screening (If Applicable):   Lab Results   Component Value Date    COVID19 Not Detected 03/28/2021           Anesthesia Evaluation  Patient summary reviewed and Nursing notes reviewed

## 2021-08-05 NOTE — OP NOTE
Operative Note    FLEXIBLE SIGMOIDOSCOPY      Patient:   Nona Castanon    :    2000    Facility:              [facility]  Referring/PCP: Norberto Jiang MD  Procedure:   Flexible Sigmoidoscopy --diagnostic  Date:     2021  Endoscopist:  Jose Baxter M.D.,FACP,FACG    Preoperative Diagnosis: Patient had IBD with fistula status post abscess drainage by the colorectal surgeon this is a follow-up exam on the rectal ulcer    Postoperative Diagnosis:    Anesthesia: MAC    Complications:  None      Estimated Blood Loss: less than 50 CC   Pathology: None      Description of Procedure:  Informed consent was obtained from the patient after explanation of the procedure including indications, description of the procedure,  benefits and possible risks and complications of the procedure, and alternatives. Questions were answered. The patient's history was reviewed and a directed physical examination was performed prior to the procedure. Patient was monitored throughout the procedure with pulse oximetry and periodic assessment of vital signs. With the patient initially in the left lateral decubitus position, a digital rectal examination was performed and revealed negative without mass, lesions or tenderness, 1 can feel the ulcer just barely inside. The Olympus video colonoscope was placed in the patient's rectum and advanced without difficulty  to  a distance of 20 cm. The prep was fair. Examination of the mucosa was performed during both introduction and withdrawal of the colonoscope. Retroflexed view of the rectum was performed. The patient  was taken to the recovery area in good condition. Post sedation note : The patient's SPO2 remained above 90% throughout the procedure. the vital signs remained stable , and no immediate complication form the procedure noted, patient will be ready for d/c when criteria is met .       Findings:     Patient had a 2 cm ulcer which seems to be healing and has granulation tissue on it no bleeding not very angry looking like he was last time but has not healed, I could not see a fistula opening     Recommendations: Follow up with me.    Follow-up with the colorectal surgery    electronically signed by  Holly Baxter M.D., FACP,FACG   on 8/5/2021 at 4:08 PM

## 2021-08-05 NOTE — H&P
HISTORY and [address]       NAME:  Leopoldo Crutch  MRN: [id number]   YOB: 2000   Date: 8/5/2021   Age: 24 y.o. Gender: male       COMPLAINT AND PRESENT HISTORY:                Leopoldo Crutch is 24 y.o.  male, undergoing for [address] [facility] per Dr Yaquelin Harrington. Patient has had previous EGD done 4/1/2021 with  Biopsy performed  . Patient denies any FH of Esophogeal Cancer. Patient has no hx of Colon Polyps/ Diverticulosis. Patient reports no changes in bowel habits no constipation or diarrhea  No GI /Rectal bleeding, experiencing red/ black/ BRBPR stools. Patient has a history of abd. Pt states since he start the Remicade, he has been feeling much better. Some times he feel cramping pain in the generalized area with bloating and decreased appetite. pain rated 4/10. No nausea or vomiting, no weight loss. Patient denies any Dysphagia. Pt has no  hx of GERD. Pt denies fever/ chills, chest pain or SOB, no palpitation or headache no Recent UTI.        NPO status: pt NPO since the past midnight   Medications taken TODAY (with sip of water): pt took Tylenol today with sip of water   Anticoagulation status: none   Prep fully completed: YES. Pt reports his BM today is liquid   Denies personal hx of blood clots in legs/lungs  Denies personal hx of MRSA infection. Denies any personal or family hx of previous complications w/anesthesia.     PAST MEDICAL HISTORY     Past Medical History:   Diagnosis Date    Anal fissure     Colitis     Crohn's colitis ([address]     Meagan-rectal abscess     Rectal pain        SURGICAL HISTORY       Past Surgical History:   Procedure Laterality Date    COLONOSCOPY  03/08/2021    COLONOSCOPY N/A 4/1/2021    COLONOSCOPY WITH BIOPSY performed by Tuan Garibay MD at [address] [place] [address] Drive  12/22/2020    rectal abcess excision, Dr. Ana Damon  2006       FAMILY HISTORY       Family History   Problem Relation Age of Onset    Irritable Bowel Syndrome Mother     Diabetes Father        SOCIAL HISTORY       Social History     Socioeconomic History    Marital status: Single     Spouse name: Not on file    Number of children: Not on file    Years of education: Not on file    Highest education level: Not on file   Occupational History    Not on file   Tobacco Use    Smoking status: Current Some Day Smoker    Smokeless tobacco: Never Used    Tobacco comment: 1-2 cigarettes per month   Vaping Use    Vaping Use: Every day    Substances: Nicotine, Flavoring    Devices: Disposable   Substance and Sexual Activity    Alcohol use: Yes     Comment: occasional    Drug use: Never    Sexual activity: Not Currently   Other Topics Concern    Not on file   Social History Narrative    Not on file     Social Determinants of Health     Financial Resource Strain:     Difficulty of Paying Living Expenses:    Food Insecurity:     Worried About Running Out of Food in the Last Year:     Ran Out of Food in the Last Year:    Transportation Needs:     Lack of Transportation (Medical):  Lack of Transportation (Non-Medical):    Physical Activity:     Days of Exercise per Week:     Minutes of Exercise per Session:    Stress:     Feeling of Stress :    Social Connections:     Frequency of Communication with Friends and Family:     Frequency of Social Gatherings with Friends and Family:     Attends Spiritism Services:     Active Member of Clubs or Organizations:     Attends Club or Organization Meetings:     Marital Status:    Intimate Partner Violence:     Fear of Current or Ex-Partner:     Emotionally Abused:     Physically Abused:     Sexually Abused:            REVIEW OF SYSTEMS      No Known Allergies    No current facility-administered medications on file prior to encounter.      Current Outpatient Medications on File Prior to Encounter   Medication Sig Dispense Refill    inFLIXimab (REMICADE) 100 MG injection Infuse 5 mg/kg intravenously See Admin Instructions Every other month      acetaminophen (TYLENOL) 500 MG tablet Take 500 mg by mouth every 6 hours as needed for Pain      Hydrocort-Pramoxine, Perianal, (ANALPRAM-HC) 2.5-1 % rectal cream Place rectally 3 times daily as needed       lidocaine (XYLOCAINE) 5 % ointment Apply topically as needed for Pain Apply topically as needed. Review of Systems   Constitutional: Positive for appetite change. Negative for activity change and fatigue. HENT: Negative. Eyes: Negative. Respiratory: Negative. Negative for cough, choking and wheezing. Cardiovascular: Negative. Gastrointestinal: Positive for abdominal pain and nausea. Negative for rectal pain and vomiting. Genitourinary: Negative. Musculoskeletal: Negative. Skin: Negative. Neurological: Negative. Hematological: Negative. Psychiatric/Behavioral: Negative. GENERAL PHYSICAL EXAM     Vitals: see nursing flow sheet for vital signs     GENERAL APPEARANCE:   Sarah Talley is 24 y.o.,  male,  nourished, conscious, alert. Does not appear to be distress or pain at this time. Physical Exam  Constitutional:       Appearance: Normal appearance. HENT:      Head: Normocephalic. Right Ear: External ear normal.      Left Ear: External ear normal.      Nose: Nose normal.      Mouth/Throat:      Mouth: Mucous membranes are moist.   Eyes:      General:         Right eye: No discharge. Left eye: No discharge. Cardiovascular:      Rate and Rhythm: Normal rate and regular rhythm. Pulses: Normal pulses. Radial pulses are 2+ on the right side and 2+ on the left side. Dorsalis pedis pulses are 2+ on the right side and 2+ on the left side. Posterior tibial pulses are 2+ on the right side and 2+ on the left side. Heart sounds: Normal heart sounds. No murmur heard. No gallop.     Pulmonary:      Effort: Pulmonary effort is normal.      Breath sounds: Normal breath sounds. No wheezing or rhonchi. Abdominal:      General: Bowel sounds are normal. There is no distension. Palpations: There is no mass. Tenderness: There is no abdominal tenderness. Musculoskeletal:         General: Normal range of motion. Cervical back: Normal range of motion and neck supple. Right lower leg: No edema. Skin:     General: Skin is warm and dry. Findings: No bruising or erythema. Neurological:      General: No focal deficit present. Mental Status: He is alert and oriented to person, place, and time.    Psychiatric:         Mood and Affect: Mood normal.         Behavior: Behavior normal.                   PROVISIONAL DIAGNOSES / SURGERY:    IBD COLITIS PERIANAL ABSCESS   SIGMOIDOSCOPY DIAGNOSTIC FLEXIBLE  Patient Active Problem List    Diagnosis Date Noted    IBD (inflammatory bowel disease) 04/07/2021           JORGE A Mcgrath CNP on 8/5/2021 at 11:54 AM

## 2021-08-05 NOTE — TELEPHONE ENCOUNTER
Pt called and had called and wanted some help with explaination and use of enema and was told by the  that instructions were on box and to just read them. Pt states he has issues with things like this and was upset with the answer he got. Pt wants it noted so if this ever happens again   He would prefer a different type of prep.

## 2021-08-05 NOTE — ANESTHESIA POSTPROCEDURE EVALUATION
POST- ANESTHESIA EVALUATION       Pt Name: Henrique Wheat  MRN: [id number]  YOB: 2000  Date of evaluation: 8/5/2021  Time:  7:50 PM      /66   Pulse 65   Temp 97.3 °F (36.3 °C) (Temporal)   Resp 20   Ht 5' 10\" (1.778 m)   Wt 147 lb (66.7 kg)   SpO2 100%   BMI 21.09 kg/m²      Consciousness Level  Awake  Cardiopulmonary Status  Stable  Pain Adequately Treated YES  Nausea / Vomiting  NO  Adequate Hydration  YES  Anesthesia Related Complications NONE      Electronically signed by Jaime Ferrell MD on 8/5/2021 at 7:50 PM       Department of Anesthesiology  Postprocedure Note    Patient: Henrique Wheat  MRN: [id number]  YOB: 2000  Date of evaluation: 8/5/2021  Time:  7:50 PM     Procedure Summary     Date: 08/05/21 [facility] [address] [facility]    Anesthesia Start: 6488 Anesthesia Stop: 5450    Procedure: SIGMOIDOSCOPY DIAGNOSTIC FLEXIBLE (N/A Anus) Diagnosis: (IBD COLITIS PERIANAL ABSCESS (PT HAS HAD COVID VACCINE DONE))    Surgeons: Gui Larry MD Responsible Provider: Jaime Ferrell MD    Anesthesia Type: general ASA Status: 2          Anesthesia Type: general    Nataliya Phase I: Nataliya Score: 10    Nataliya Phase II: Nataliya Score: 10    Last vitals: Reviewed and per EMR flowsheets.        Anesthesia Post Evaluation

## 2021-08-11 ENCOUNTER — HOSPITAL ENCOUNTER (OUTPATIENT)
Dept: INFUSION THERAPY | Facility: MEDICAL CENTER | Age: 21
Discharge: HOME | End: 2021-08-11
Payer: MEDICARE

## 2021-08-11 ENCOUNTER — TELEPHONE (OUTPATIENT)
Dept: GASTROENTEROLOGY | Age: 21
End: 2021-08-11

## 2021-08-11 VITALS — HEART RATE: 66 BPM | SYSTOLIC BLOOD PRESSURE: 104 MMHG | DIASTOLIC BLOOD PRESSURE: 57 MMHG

## 2021-08-11 DIAGNOSIS — K52.9 IBD (INFLAMMATORY BOWEL DISEASE): Primary | ICD-10-CM

## 2021-08-11 PROCEDURE — 96413 CHEMO IV INFUSION 1 HR: CPT

## 2021-08-11 PROCEDURE — 96415 CHEMO IV INFUSION ADDL HR: CPT

## 2021-08-11 PROCEDURE — 6360000002 HC RX W HCPCS: Performed by: INTERNAL MEDICINE

## 2021-08-11 PROCEDURE — 6370000000 HC RX 637 (ALT 250 FOR IP): Performed by: INTERNAL MEDICINE

## 2021-08-11 PROCEDURE — 2580000003 HC RX 258: Performed by: INTERNAL MEDICINE

## 2021-08-11 RX ORDER — SODIUM CHLORIDE 9 MG/ML
INJECTION, SOLUTION INTRAVENOUS ONCE
Status: CANCELLED | OUTPATIENT
Start: 2021-09-22 | End: 2021-09-22

## 2021-08-11 RX ORDER — SODIUM CHLORIDE 0.9 % (FLUSH) 0.9 %
5-40 SYRINGE (ML) INJECTION PRN
Status: CANCELLED | OUTPATIENT
Start: 2021-09-22

## 2021-08-11 RX ORDER — ACETAMINOPHEN 325 MG/1
650 TABLET ORAL ONCE
Status: CANCELLED | OUTPATIENT
Start: 2021-09-22

## 2021-08-11 RX ORDER — HEPARIN SODIUM (PORCINE) LOCK FLUSH IV SOLN 100 UNIT/ML 100 UNIT/ML
500 SOLUTION INTRAVENOUS PRN
Status: CANCELLED | OUTPATIENT
Start: 2021-09-22

## 2021-08-11 RX ORDER — SODIUM CHLORIDE 9 MG/ML
25 INJECTION, SOLUTION INTRAVENOUS PRN
Status: CANCELLED | OUTPATIENT
Start: 2021-09-22

## 2021-08-11 RX ORDER — DIPHENHYDRAMINE HCL 25 MG
25 TABLET ORAL ONCE
Status: COMPLETED | OUTPATIENT
Start: 2021-08-11 | End: 2021-08-11

## 2021-08-11 RX ORDER — ACETAMINOPHEN 325 MG/1
650 TABLET ORAL ONCE
Status: COMPLETED | OUTPATIENT
Start: 2021-08-11 | End: 2021-08-11

## 2021-08-11 RX ORDER — SODIUM CHLORIDE 9 MG/ML
INJECTION, SOLUTION INTRAVENOUS CONTINUOUS
Status: CANCELLED | OUTPATIENT
Start: 2021-09-22

## 2021-08-11 RX ORDER — DIPHENHYDRAMINE HYDROCHLORIDE 50 MG/ML
50 INJECTION INTRAMUSCULAR; INTRAVENOUS ONCE
Status: CANCELLED | OUTPATIENT
Start: 2021-09-22 | End: 2021-09-22

## 2021-08-11 RX ORDER — METHYLPREDNISOLONE SODIUM SUCCINATE 125 MG/2ML
125 INJECTION, POWDER, LYOPHILIZED, FOR SOLUTION INTRAMUSCULAR; INTRAVENOUS ONCE
Status: CANCELLED | OUTPATIENT
Start: 2021-09-22 | End: 2021-09-22

## 2021-08-11 RX ORDER — DIPHENHYDRAMINE HCL 25 MG
25 TABLET ORAL ONCE
Status: CANCELLED | OUTPATIENT
Start: 2021-09-22

## 2021-08-11 RX ORDER — SODIUM CHLORIDE 9 MG/ML
INJECTION, SOLUTION INTRAVENOUS ONCE
Status: COMPLETED | OUTPATIENT
Start: 2021-08-11 | End: 2021-08-11

## 2021-08-11 RX ADMIN — SODIUM CHLORIDE 300 MG: 9 INJECTION, SOLUTION INTRAVENOUS at 08:59

## 2021-08-11 RX ADMIN — ACETAMINOPHEN 650 MG: 325 TABLET ORAL at 08:41

## 2021-08-11 RX ADMIN — DIPHENHYDRAMINE HYDROCHLORIDE 25 MG: 25 TABLET ORAL at 08:41

## 2021-08-11 RX ADMIN — SODIUM CHLORIDE: 9 INJECTION, SOLUTION INTRAVENOUS at 08:41

## 2021-08-11 ASSESSMENT — PAIN SCALES - GENERAL: PAINLEVEL_OUTOF10: 0

## 2021-08-11 NOTE — PROGRESS NOTES
Andria Maloney arrives ambulatory alone  Order for Remicade infusion  C/o fatigue  He reports medication is helping his Crohns diseas  Iv started with #24 cathlon to lt hand per policy   Good blood return noted  See [facility] for pre medications  859 Renflexis initiated at 10ml/hr x 15 minutes   Vitals taken and charted on flowsheet  921 iv rate increased to 20ml/hr x 15 minutes   No reaction  948 iv rate increased to 40ml/hr x 15 minutes  949 iv rate increased to 80ml/hr x 15 minutes  1004 iv rate increased to 150ml/hr x 30 minutes   1035 iv rate increased to 250ml/hr until completion of infusion at 1111  No reaction noted  Iv line flushed for thirty minutes   Iv line discontinued with needle intact  Pressure dressing applied  Discharged per ambulatory

## 2021-08-11 NOTE — TELEPHONE ENCOUNTER
Pt is calling and wants to know that since he had his procedure and Dr told him everything looked ok and he is feeling good is it really necessary that he keep his appt tomorrow at [facility]. Pt does not want to go if things are ok. Please call to discuss with pt.

## 2021-08-12 ENCOUNTER — TELEPHONE (OUTPATIENT)
Dept: GASTROENTEROLOGY | Age: 21
End: 2021-08-12

## 2021-08-12 NOTE — TELEPHONE ENCOUNTER
Writer spoke with patient and let him know yes he needs to see [facility] due to the perianal fissure. He may need to be surgically worked on. Patient states he understands.

## 2021-10-08 ENCOUNTER — HOSPITAL ENCOUNTER (OUTPATIENT)
Dept: INFUSION THERAPY | Facility: MEDICAL CENTER | Age: 21
Discharge: HOME | End: 2021-10-08
Payer: MEDICARE

## 2021-10-08 VITALS
TEMPERATURE: 97.6 F | HEART RATE: 54 BPM | RESPIRATION RATE: 16 BRPM | DIASTOLIC BLOOD PRESSURE: 59 MMHG | SYSTOLIC BLOOD PRESSURE: 108 MMHG

## 2021-10-08 DIAGNOSIS — K52.9 IBD (INFLAMMATORY BOWEL DISEASE): Primary | ICD-10-CM

## 2021-10-08 PROCEDURE — 96413 CHEMO IV INFUSION 1 HR: CPT

## 2021-10-08 PROCEDURE — 6370000000 HC RX 637 (ALT 250 FOR IP): Performed by: INTERNAL MEDICINE

## 2021-10-08 PROCEDURE — 96415 CHEMO IV INFUSION ADDL HR: CPT

## 2021-10-08 PROCEDURE — 6360000002 HC RX W HCPCS: Performed by: INTERNAL MEDICINE

## 2021-10-08 PROCEDURE — 2580000003 HC RX 258: Performed by: INTERNAL MEDICINE

## 2021-10-08 RX ORDER — METHYLPREDNISOLONE SODIUM SUCCINATE 125 MG/2ML
125 INJECTION, POWDER, LYOPHILIZED, FOR SOLUTION INTRAMUSCULAR; INTRAVENOUS ONCE
Status: CANCELLED | OUTPATIENT
Start: 2021-12-03 | End: 2021-12-03

## 2021-10-08 RX ORDER — DIPHENHYDRAMINE HYDROCHLORIDE 50 MG/ML
50 INJECTION INTRAMUSCULAR; INTRAVENOUS ONCE
Status: CANCELLED | OUTPATIENT
Start: 2021-12-03 | End: 2021-12-03

## 2021-10-08 RX ORDER — SODIUM CHLORIDE 9 MG/ML
INJECTION, SOLUTION INTRAVENOUS CONTINUOUS
Status: CANCELLED | OUTPATIENT
Start: 2021-12-03

## 2021-10-08 RX ORDER — DIPHENHYDRAMINE HCL 25 MG
25 TABLET ORAL ONCE
Status: COMPLETED | OUTPATIENT
Start: 2021-10-08 | End: 2021-10-08

## 2021-10-08 RX ORDER — SODIUM CHLORIDE 9 MG/ML
INJECTION, SOLUTION INTRAVENOUS ONCE
Status: CANCELLED | OUTPATIENT
Start: 2021-12-03 | End: 2021-12-03

## 2021-10-08 RX ORDER — SODIUM CHLORIDE 0.9 % (FLUSH) 0.9 %
5-40 SYRINGE (ML) INJECTION PRN
Status: CANCELLED | OUTPATIENT
Start: 2021-12-03

## 2021-10-08 RX ORDER — DIPHENHYDRAMINE HCL 25 MG
25 TABLET ORAL ONCE
Status: CANCELLED | OUTPATIENT
Start: 2021-12-03

## 2021-10-08 RX ORDER — ACETAMINOPHEN 325 MG/1
650 TABLET ORAL ONCE
Status: COMPLETED | OUTPATIENT
Start: 2021-10-08 | End: 2021-10-08

## 2021-10-08 RX ORDER — SODIUM CHLORIDE 9 MG/ML
INJECTION, SOLUTION INTRAVENOUS ONCE
Status: COMPLETED | OUTPATIENT
Start: 2021-10-08 | End: 2021-10-08

## 2021-10-08 RX ORDER — ACETAMINOPHEN 325 MG/1
650 TABLET ORAL ONCE
Status: CANCELLED | OUTPATIENT
Start: 2021-12-03

## 2021-10-08 RX ORDER — SODIUM CHLORIDE 9 MG/ML
25 INJECTION, SOLUTION INTRAVENOUS PRN
Status: CANCELLED | OUTPATIENT
Start: 2021-12-03

## 2021-10-08 RX ORDER — HEPARIN SODIUM (PORCINE) LOCK FLUSH IV SOLN 100 UNIT/ML 100 UNIT/ML
500 SOLUTION INTRAVENOUS PRN
Status: CANCELLED | OUTPATIENT
Start: 2021-12-03

## 2021-10-08 RX ADMIN — DIPHENHYDRAMINE HYDROCHLORIDE 25 MG: 25 TABLET ORAL at 08:42

## 2021-10-08 RX ADMIN — SODIUM CHLORIDE 300 MG: 9 INJECTION, SOLUTION INTRAVENOUS at 09:05

## 2021-10-08 RX ADMIN — SODIUM CHLORIDE: 9 INJECTION, SOLUTION INTRAVENOUS at 08:37

## 2021-10-08 RX ADMIN — ACETAMINOPHEN 650 MG: 325 TABLET ORAL at 08:42

## 2021-10-08 ASSESSMENT — PAIN SCALES - GENERAL: PAINLEVEL_OUTOF10: 0

## 2021-10-08 NOTE — PROGRESS NOTES
Patient arrive ambulatory for Renflexis infusion. Denies complaint or concern; does state last couple weeks feels is a flare up. Vitals as charted. Peripheral IV established per policy. Renflexis infused with no sign of adverse reaction; line flushed. Patient monitored post infusion. Stable vitals as charted throughout/post.  Intact IV catheter removed; pressure dressing applied. Patient ambulate off unit per self at discharge; stopping at front to schedule future appointment.

## 2021-10-12 NOTE — PROGRESS NOTES
GI CLINIC FOLLOW UP    INTERVAL HISTORY:   No referring provider defined for this encounter. Chief Complaint   Patient presents with    Inflammatory Bowel Disease     Patient is f/u on IBD. He has been seeing [facility]. He states he is doing better. HISTORY OF PRESENT ILLNESS: Sherrell Blizzard is a 24 y.o. male , referred for evaluation of*  IBD   . Patient is here for follow-up  Doing better  No flareup no abdominal pain no bleeding no nausea no vomiting no fever no chills eating and drinking well  Occasionally still sees some drainage we just did a flex sig on him the result is as below  We have referred to the [facility] colorectal service  He might have setons  But before that is getting MRI of the pelvis to rule out fistulas  I went to repeat his colonoscopy  And they ordered a bunch of labs on him including his Remicade antibodies and Remicade level  All those tests are still pending  Review of system with the patient was unremarkable  Flex :  Patient had a 2 cm ulcer which seems to be healing and has granulation tissue on it no bleeding not very angry looking like he was last time but has not healed, I could not see a fistula opening     Recommendations: Follow up with me. Follow-up with the colorectal surgery     electronically signed by  Jess Baxter M.D., FACP,FACG   on 8/5/2021 at 4:08 PM      Past Medical,Family, and Social History reviewed and does contribute to the patient presentingcondition. Patient's PMH/PSH,SH,PSYCH Hx, MEDs, ALLERGIES, and ROS were all reviewed and updated in the appropriate sections.     PAST MEDICAL HISTORY:  Past Medical History:   Diagnosis Date    Anal fissure     Colitis     Crohn's colitis ([address]     Meagan-rectal abscess     Rectal pain        Past Surgical History:   Procedure Laterality Date    COLONOSCOPY  03/08/2021    COLONOSCOPY N/A 4/1/2021    COLONOSCOPY WITH BIOPSY performed by Destin Pearson MD at [address] [place] [address] SURGICAL HISTORY  2020    rectal abcess excision, Dr. Magaly Doran 2021    Jason Gonzales performed by Anat Peguero MD at [address] [place] [address]         CURRENT MEDICATIONS:    Current Outpatient Medications:     hyoscyamine (ANASPAZ;LEVSIN) 125 MCG tablet, Take 0.125 mg by mouth every 6 hours as needed, Disp: , Rfl:     inFLIXimab (REMICADE) 100 MG injection, Infuse 5 mg/kg intravenously See Admin Instructions Every other month, Disp: , Rfl:     acetaminophen (TYLENOL) 500 MG tablet, Take 500 mg by mouth every 6 hours as needed for Pain, Disp: , Rfl:     Hydrocort-Pramoxine, Perianal, (ANALPRAM-HC) 2.5-1 % rectal cream, Place rectally 3 times daily as needed , Disp: , Rfl:     lidocaine (XYLOCAINE) 5 % ointment, Apply topically as needed for Pain Apply topically as needed. , Disp: , Rfl:     ALLERGIES:   No Known Allergies    FAMILY HISTORY:       Problem Relation Age of Onset    Irritable Bowel Syndrome Mother     Diabetes Father          SOCIAL HISTORY:   Social History     Socioeconomic History    Marital status: Single     Spouse name: Not on file    Number of children: Not on file    Years of education: Not on file    Highest education level: Not on file   Occupational History    Not on file   Tobacco Use    Smoking status: Former Smoker     Quit date: 10/6/2021     Years since quittin.0    Smokeless tobacco: Never Used    Tobacco comment: 1-2 cigarettes per month   Vaping Use    Vaping Use: Every day    Substances: Nicotine, Flavoring    Devices: Disposable   Substance and Sexual Activity    Alcohol use: Yes     Comment: occasional    Drug use: Never    Sexual activity: Not Currently   Other Topics Concern    Not on file   Social History Narrative    Not on file     Social Determinants of Health     Financial Resource Strain:     Difficulty of Paying Living Expenses:    Food Insecurity:     Worried About Running Out of Food in the Last Year:    [address] in the Last Year:    Transportation Needs:     Lack of Transportation (Medical):  Lack of Transportation (Non-Medical):    Physical Activity:     Days of Exercise per Week:     Minutes of Exercise per Session:    Stress:     Feeling of Stress :    Social Connections:     Frequency of Communication with Friends and Family:     Frequency of Social Gatherings with Friends and Family:     Attends Christian Services:     Active Member of Clubs or Organizations:     Attends Club or Organization Meetings:     Marital Status:    Intimate Partner Violence:     Fear of Current or Ex-Partner:     Emotionally Abused:     Physically Abused:     Sexually Abused:        REVIEW OF SYSTEMS: A 12-point review of systemswas obtained and pertinent positives and negatives were enumerated above in the history of present illness. All other reviewed systems / symptoms were negative. Review of Systems   Constitutional: Negative for appetite change, fatigue and unexpected weight change. HENT: Negative for mouth sores and trouble swallowing. Eyes: Positive for visual disturbance (glasses). Respiratory: Negative for cough, choking and wheezing. Cardiovascular: Negative for chest pain, palpitations and leg swelling. Gastrointestinal: Negative for abdominal distention, abdominal pain, anal bleeding, blood in stool (pus), constipation, diarrhea, nausea, rectal pain and vomiting. Genitourinary: Negative for difficulty urinating. Allergic/Immunologic: Negative for environmental allergies and food allergies. Neurological: Negative for dizziness, weakness, light-headedness, numbness and headaches. Hematological: Does not bruise/bleed easily. Psychiatric/Behavioral: Negative for sleep disturbance. The patient is not nervous/anxious.             LABORATORY DATA: Reviewed  No results found for: WBC, HGB, HCT, MCV, PLT, NA, K, CL, CO2, BUN, CREATININE, LABPROT, LABALBU, GGT, BILITOT, ALKPHOS, AST, ALT, INR      No results found for: RBC, HGB, MCV, MCH, MCHC, RDW, MPV, BASOPCT, LYMPHSABS, MONOSABS, NEUTROABS, EOSABS, BASOSABS      DIAGNOSTIC TESTING:     No results found. PHYSICAL EXAMINATION: Vital signs reviewed per the nursing documentation. /62   Pulse 62   Temp 98.5 °F (36.9 °C)   Wt 147 lb (66.7 kg)   BMI 21.09 kg/m²   Body mass index is 21.09 kg/m². Physical Exam  Vitals and nursing note reviewed. Constitutional:       General: He is not in acute distress. Appearance: He is well-developed. He is not diaphoretic. HENT:      Head: Normocephalic and atraumatic. Eyes:      General: No scleral icterus. Pupils: Pupils are equal, round, and reactive to light. Neck:      Thyroid: No thyromegaly. Vascular: No JVD. Trachea: No tracheal deviation. Cardiovascular:      Rate and Rhythm: Normal rate and regular rhythm. Heart sounds: Normal heart sounds. No murmur heard. Pulmonary:      Effort: Pulmonary effort is normal. No respiratory distress. Breath sounds: Normal breath sounds. No wheezing. Abdominal:      General: Bowel sounds are normal. There is no distension. Palpations: Abdomen is soft. There is no mass. Tenderness: There is no abdominal tenderness. There is no guarding or rebound. Musculoskeletal:         General: No tenderness. Normal range of motion. Cervical back: Normal range of motion and neck supple. Skin:     General: Skin is warm. Coloration: Skin is not pale. Findings: No erythema or rash. Comments: He is not diaphoretic   Neurological:      Mental Status: He is alert and oriented to person, place, and time. Deep Tendon Reflexes: Reflexes are normal and symmetric. Psychiatric:         Behavior: Behavior normal.         Thought Content:  Thought content normal.         Judgment: Judgment normal.           IMPRESSION: Mr. Margraita Benito is a 24 y.o. male with      Diagnosis Orders   1. IBD (inflammatory bowel disease)  Infliximab Activity and Neutralizing AB   2. Colitis     3. Perianal abscess     Told her to follow the [facility] and do the testing as stated in the HPI  Meanwhile we will continue with infliximab  Patient seems to be doing okay at this point      Diet/life style/natural hx /complication of the dx were all explained in details   Past medical, past surgical, social history, psychiatric history, medications or allergies, all reviewed and  updated    Thank you for allowing me to participate in the care of Mr. Jaime Garcia. For any further questions please do not hesitate to contact me. I have reviewed and agree with the ROS entered by the MA/RN. Note is dictated utilizing voice recognition software. Unfortunately this leads to occasional typographical errors. Please contact our office if you have any questions.       Yeyo Yuan MD  [facility]  O: #[phone]

## 2021-10-13 ENCOUNTER — OFFICE VISIT (OUTPATIENT)
Dept: GASTROENTEROLOGY | Age: 21
End: 2021-10-13
Payer: MEDICARE

## 2021-10-13 VITALS
DIASTOLIC BLOOD PRESSURE: 62 MMHG | BODY MASS INDEX: 21.09 KG/M2 | HEART RATE: 62 BPM | TEMPERATURE: 98.5 F | SYSTOLIC BLOOD PRESSURE: 118 MMHG | WEIGHT: 147 LBS

## 2021-10-13 DIAGNOSIS — K52.9 IBD (INFLAMMATORY BOWEL DISEASE): Primary | ICD-10-CM

## 2021-10-13 DIAGNOSIS — K52.9 COLITIS: ICD-10-CM

## 2021-10-13 DIAGNOSIS — K61.0 PERIANAL ABSCESS: ICD-10-CM

## 2021-10-13 PROCEDURE — 1036F TOBACCO NON-USER: CPT | Performed by: INTERNAL MEDICINE

## 2021-10-13 PROCEDURE — G8420 CALC BMI NORM PARAMETERS: HCPCS | Performed by: INTERNAL MEDICINE

## 2021-10-13 PROCEDURE — G8427 DOCREV CUR MEDS BY ELIG CLIN: HCPCS | Performed by: INTERNAL MEDICINE

## 2021-10-13 PROCEDURE — G8484 FLU IMMUNIZE NO ADMIN: HCPCS | Performed by: INTERNAL MEDICINE

## 2021-10-13 PROCEDURE — 99214 OFFICE O/P EST MOD 30 MIN: CPT | Performed by: INTERNAL MEDICINE

## 2021-10-13 RX ORDER — HYOSCYAMINE SULFATE 0.125 MG
0.12 TABLET ORAL EVERY 6 HOURS PRN
COMMUNITY
Start: 2021-09-23 | End: 2021-12-22

## 2021-10-13 ASSESSMENT — ENCOUNTER SYMPTOMS
TROUBLE SWALLOWING: 0
DIARRHEA: 0
CONSTIPATION: 0
WHEEZING: 0
NAUSEA: 0
ABDOMINAL DISTENTION: 0
BLOOD IN STOOL: 0
VOMITING: 0
ANAL BLEEDING: 0
ABDOMINAL PAIN: 0
CHOKING: 0
RECTAL PAIN: 0
COUGH: 0

## 2021-11-16 ENCOUNTER — TELEPHONE (OUTPATIENT)
Dept: GASTROENTEROLOGY | Age: 21
End: 2021-11-16

## 2021-11-17 NOTE — TELEPHONE ENCOUNTER
Patient LM asking if he could have labs done in [facility] instead of driving to [facility] [employer]   Writer returned call and advised pt to have labs done at [facility]

## 2021-12-01 ENCOUNTER — HOSPITAL ENCOUNTER (OUTPATIENT)
Age: 21
Setting detail: SPECIMEN
Discharge: HOME | End: 2021-12-01

## 2021-12-01 DIAGNOSIS — K52.9 IBD (INFLAMMATORY BOWEL DISEASE): ICD-10-CM

## 2021-12-03 ENCOUNTER — HOSPITAL ENCOUNTER (OUTPATIENT)
Dept: INFUSION THERAPY | Facility: MEDICAL CENTER | Age: 21
Discharge: HOME | End: 2021-12-03
Payer: MEDICARE

## 2021-12-03 VITALS
RESPIRATION RATE: 16 BRPM | HEART RATE: 78 BPM | TEMPERATURE: 97.8 F | SYSTOLIC BLOOD PRESSURE: 121 MMHG | DIASTOLIC BLOOD PRESSURE: 84 MMHG

## 2021-12-03 DIAGNOSIS — K52.9 IBD (INFLAMMATORY BOWEL DISEASE): Primary | ICD-10-CM

## 2021-12-03 PROCEDURE — 6370000000 HC RX 637 (ALT 250 FOR IP): Performed by: INTERNAL MEDICINE

## 2021-12-03 PROCEDURE — 96365 THER/PROPH/DIAG IV INF INIT: CPT

## 2021-12-03 PROCEDURE — 6360000002 HC RX W HCPCS: Performed by: INTERNAL MEDICINE

## 2021-12-03 PROCEDURE — 96415 CHEMO IV INFUSION ADDL HR: CPT

## 2021-12-03 PROCEDURE — 96366 THER/PROPH/DIAG IV INF ADDON: CPT

## 2021-12-03 PROCEDURE — 2580000003 HC RX 258: Performed by: INTERNAL MEDICINE

## 2021-12-03 PROCEDURE — 96413 CHEMO IV INFUSION 1 HR: CPT

## 2021-12-03 RX ORDER — SODIUM CHLORIDE 9 MG/ML
INJECTION, SOLUTION INTRAVENOUS ONCE
Status: CANCELLED | OUTPATIENT
Start: 2022-01-28 | End: 2022-01-28

## 2021-12-03 RX ORDER — DIPHENHYDRAMINE HCL 25 MG
25 TABLET ORAL ONCE
Status: COMPLETED | OUTPATIENT
Start: 2021-12-03 | End: 2021-12-03

## 2021-12-03 RX ORDER — HEPARIN SODIUM (PORCINE) LOCK FLUSH IV SOLN 100 UNIT/ML 100 UNIT/ML
500 SOLUTION INTRAVENOUS PRN
Status: CANCELLED | OUTPATIENT
Start: 2022-01-28

## 2021-12-03 RX ORDER — METHYLPREDNISOLONE SODIUM SUCCINATE 125 MG/2ML
125 INJECTION, POWDER, LYOPHILIZED, FOR SOLUTION INTRAMUSCULAR; INTRAVENOUS ONCE
Status: CANCELLED | OUTPATIENT
Start: 2022-01-28 | End: 2022-01-28

## 2021-12-03 RX ORDER — DIPHENHYDRAMINE HYDROCHLORIDE 50 MG/ML
50 INJECTION INTRAMUSCULAR; INTRAVENOUS ONCE
Status: CANCELLED | OUTPATIENT
Start: 2022-01-28 | End: 2022-01-28

## 2021-12-03 RX ORDER — ACETAMINOPHEN 325 MG/1
650 TABLET ORAL ONCE
Status: CANCELLED | OUTPATIENT
Start: 2022-01-28

## 2021-12-03 RX ORDER — SODIUM CHLORIDE 9 MG/ML
INJECTION, SOLUTION INTRAVENOUS ONCE
Status: COMPLETED | OUTPATIENT
Start: 2021-12-03 | End: 2021-12-03

## 2021-12-03 RX ORDER — ACETAMINOPHEN 325 MG/1
650 TABLET ORAL ONCE
Status: COMPLETED | OUTPATIENT
Start: 2021-12-03 | End: 2021-12-03

## 2021-12-03 RX ORDER — DIPHENHYDRAMINE HCL 25 MG
25 TABLET ORAL ONCE
Status: CANCELLED | OUTPATIENT
Start: 2022-01-28

## 2021-12-03 RX ORDER — SODIUM CHLORIDE 9 MG/ML
INJECTION, SOLUTION INTRAVENOUS CONTINUOUS
Status: CANCELLED | OUTPATIENT
Start: 2022-01-28

## 2021-12-03 RX ORDER — SODIUM CHLORIDE 0.9 % (FLUSH) 0.9 %
5-40 SYRINGE (ML) INJECTION PRN
Status: CANCELLED | OUTPATIENT
Start: 2022-01-28

## 2021-12-03 RX ORDER — SODIUM CHLORIDE 9 MG/ML
25 INJECTION, SOLUTION INTRAVENOUS PRN
Status: CANCELLED | OUTPATIENT
Start: 2022-01-28

## 2021-12-03 RX ADMIN — ACETAMINOPHEN 650 MG: 325 TABLET ORAL at 08:48

## 2021-12-03 RX ADMIN — SODIUM CHLORIDE: 9 INJECTION, SOLUTION INTRAVENOUS at 08:42

## 2021-12-03 RX ADMIN — DIPHENHYDRAMINE HYDROCHLORIDE 25 MG: 25 TABLET ORAL at 08:48

## 2021-12-03 RX ADMIN — SODIUM CHLORIDE 300 MG: 9 INJECTION, SOLUTION INTRAVENOUS at 09:32

## 2021-12-03 ASSESSMENT — PAIN SCALES - GENERAL: PAINLEVEL_OUTOF10: 0

## 2021-12-03 NOTE — PROGRESS NOTES
Patient arrived ambulatory per self for Renflexis infusion. States that he is feeling well now but about 1 week ago had a flare up with symptoms of diarrhea and a hard abdomen. Peripheral IV established per policy. Patient premedicated per plan. Renflexis initiated at 20ml/hr for 15 minutes with no reaction, increased to  40ml/hr for 15 minutes with no reaction, 80ml/hr for 15 minutes with no reaction,  rate of 125ml/hr for a little over an hour. Writer then discussed with pharmacist, Kristina Estrada that max rate is actually 250ml/hr and so rate was increased to 250ml/hr for the remainder of infusion with no reaction. Line flushed and patient monitored post infusion. Stable vitals as charted. Patient discharged ambulatory and given copy of next appointment on calendar.

## 2021-12-04 LAB
EER INFLIXIMAB: NORMAL
INFLIXIMAB ACTIVITY: 3.38 UG/ML
INFLIXIMAB NEUTRALIZING ANTIBODY: NOT DETECTED

## 2022-01-04 ENCOUNTER — OFFICE VISIT (OUTPATIENT)
Dept: GASTROENTEROLOGY | Age: 22
End: 2022-01-04
Payer: MEDICARE

## 2022-01-04 VITALS
SYSTOLIC BLOOD PRESSURE: 129 MMHG | TEMPERATURE: 97.2 F | HEART RATE: 84 BPM | WEIGHT: 153 LBS | DIASTOLIC BLOOD PRESSURE: 79 MMHG | BODY MASS INDEX: 21.95 KG/M2

## 2022-01-04 DIAGNOSIS — K52.9 IBD (INFLAMMATORY BOWEL DISEASE): Primary | ICD-10-CM

## 2022-01-04 DIAGNOSIS — K61.0 PERIANAL ABSCESS: ICD-10-CM

## 2022-01-04 PROCEDURE — 99214 OFFICE O/P EST MOD 30 MIN: CPT | Performed by: INTERNAL MEDICINE

## 2022-01-04 PROCEDURE — G8420 CALC BMI NORM PARAMETERS: HCPCS | Performed by: INTERNAL MEDICINE

## 2022-01-04 PROCEDURE — 1036F TOBACCO NON-USER: CPT | Performed by: INTERNAL MEDICINE

## 2022-01-04 PROCEDURE — G8427 DOCREV CUR MEDS BY ELIG CLIN: HCPCS | Performed by: INTERNAL MEDICINE

## 2022-01-04 PROCEDURE — G8484 FLU IMMUNIZE NO ADMIN: HCPCS | Performed by: INTERNAL MEDICINE

## 2022-01-04 ASSESSMENT — ENCOUNTER SYMPTOMS
CONSTIPATION: 0
ANAL BLEEDING: 0
CHOKING: 0
TROUBLE SWALLOWING: 0
WHEEZING: 0
BLOOD IN STOOL: 0
ABDOMINAL DISTENTION: 0
DIARRHEA: 0
RECTAL PAIN: 0
ABDOMINAL PAIN: 0
NAUSEA: 0
COUGH: 0
VOMITING: 0

## 2022-01-04 NOTE — PROGRESS NOTES
GI CLINIC FOLLOW UP    INTERVAL HISTORY:   No referring provider defined for this encounter. Chief Complaint   Patient presents with    Inflammatory Bowel Disease     Patient is f/u on IBD. He tsates he is doing very well. NO pain or bleeding. He does have some pus if he puts a large amount of pressure on where the fistula is. HISTORY OF PRESENT ILLNESS: Miguel Schilling is a 24 y.o. male , referred for evaluation of*Crohn's disease with perianal abscess/fistula    Patient is here for follow-up  Scheduled to see the [facility] surgery tomorrow  Had MRA and pelvic MRI  And a colonoscopy there  Still some track for a perianal possible fistula but is not bleeding anywhere to other organs  It is not draining it is not bothering him  He does feel something when he is defecating and if he pushes on it he does have a little bit of drainage but otherwise no drainage spontaneously  No soiling of his underwear  No bleeding  No fever no chills no rectal pain spontaneously at all    He is on Remicade  Denied any extraintestinal symptoms at all  And denied any other GI symptoms    Past Medical,Family, and Social History reviewed and does contribute to the patient presentingcondition. Patient's PMH/PSH,SH,PSYCH Hx, MEDs, ALLERGIES, and ROS were all reviewed and updated in the appropriate sections.     PAST MEDICAL HISTORY:  Past Medical History:   Diagnosis Date    Anal fissure     Colitis     Crohn's colitis ([address]     Meagan-rectal abscess     Rectal pain        Past Surgical History:   Procedure Laterality Date    COLONOSCOPY  03/08/2021    COLONOSCOPY N/A 4/1/2021    COLONOSCOPY WITH BIOPSY performed by Elyse Dozier MD at [address]  12/22/2020    rectal abcess excision, Dr. Omar Park N/A 8/5/2021    Edinson Das performed by Elyse Dozier MD at [address] [facility]  2006       CURRENT MEDICATIONS:    Current Outpatient Medications:     hyoscyamine (ANASPAZ;LEVSIN) 125 MCG tablet, Take 0.125 mg by mouth every 6 hours as needed, Disp: , Rfl:     inFLIXimab (REMICADE) 100 MG injection, Infuse 5 mg/kg intravenously See Admin Instructions Every other month, Disp: , Rfl:     acetaminophen (TYLENOL) 500 MG tablet, Take 500 mg by mouth every 6 hours as needed for Pain, Disp: , Rfl:     Hydrocort-Pramoxine, Perianal, (ANALPRAM-HC) 2.5-1 % rectal cream, Place rectally 3 times daily as needed , Disp: , Rfl:     lidocaine (XYLOCAINE) 5 % ointment, Apply topically as needed for Pain Apply topically as needed. , Disp: , Rfl:     ALLERGIES:   No Known Allergies    FAMILY HISTORY:       Problem Relation Age of Onset    Irritable Bowel Syndrome Mother     Diabetes Father          SOCIAL HISTORY:   Social History     Socioeconomic History    Marital status: Single     Spouse name: Not on file    Number of children: Not on file    Years of education: Not on file    Highest education level: Not on file   Occupational History    Not on file   Tobacco Use    Smoking status: Former Smoker     Quit date: 10/6/2021     Years since quittin.2    Smokeless tobacco: Never Used    Tobacco comment: 1-2 cigarettes per month   Vaping Use    Vaping Use: Every day    Substances: Nicotine, Flavoring    Devices: Disposable   Substance and Sexual Activity    Alcohol use: Yes     Comment: occasional    Drug use: Never    Sexual activity: Not Currently   Other Topics Concern    Not on file   Social History Narrative    Not on file     Social Determinants of Health     Financial Resource Strain:     Difficulty of Paying Living Expenses: Not on file   Food Insecurity:     Worried About Running Out of Food in the Last Year: Not on file    Joan of Food in the Last Year: Not on file   Transportation Needs:     Lack of Transportation (Medical): Not on file    Lack of Transportation (Non-Medical):  Not on file   Physical Activity:  Days of Exercise per Week: Not on file    Minutes of Exercise per Session: Not on file   Stress:     Feeling of Stress : Not on file   Social Connections:     Frequency of Communication with Friends and Family: Not on file    Frequency of Social Gatherings with Friends and Family: Not on file    Attends Quaker Services: Not on file    Active Member of [address] [place] [address] [employer] or Organizations: Not on file    Attends Club or Organization Meetings: Not on file    Marital Status: Not on file   Intimate Partner Violence:     Fear of Current or Ex-Partner: Not on file    Emotionally Abused: Not on file    Physically Abused: Not on file    Sexually Abused: Not on file   Housing Stability:     Unable to Pay for Housing in the Last Year: Not on file    Number of [place] in the Last Year: Not on file    Unstable Housing in the Last Year: Not on file       REVIEW OF SYSTEMS: A 12-point review of systemswas obtained and pertinent positives and negatives were enumerated above in the history of present illness. All other reviewed systems / symptoms were negative. Review of Systems   Constitutional: Negative for appetite change, fatigue and unexpected weight change. HENT: Negative for mouth sores and trouble swallowing. Eyes: Positive for visual disturbance (glasses). Respiratory: Negative for cough, choking and wheezing. Cardiovascular: Negative for chest pain, palpitations and leg swelling. Gastrointestinal: Negative for abdominal distention, abdominal pain, anal bleeding, blood in stool, constipation, diarrhea, nausea, rectal pain and vomiting. Genitourinary: Negative for difficulty urinating. Allergic/Immunologic: Negative for environmental allergies and food allergies. Neurological: Negative for dizziness, weakness, light-headedness, numbness and headaches. Hematological: Does not bruise/bleed easily. Psychiatric/Behavioral: Negative for sleep disturbance. The patient is not nervous/anxious. LABORATORY DATA: Reviewed  No results found for: WBC, HGB, HCT, MCV, PLT, NA, K, CL, CO2, BUN, CREATININE, LABPROT, LABALBU, GGT, BILITOT, ALKPHOS, AST, ALT, INR      No results found for: RBC, HGB, MCV, MCH, MCHC, RDW, MPV, BASOPCT, LYMPHSABS, MONOSABS, NEUTROABS, EOSABS, BASOSABS      DIAGNOSTIC TESTING:     No results found. PHYSICAL EXAMINATION: Vital signs reviewed per the nursing documentation. /79   Pulse 84   Temp 97.2 °F (36.2 °C)   Wt 153 lb (69.4 kg)   BMI 21.95 kg/m²   Body mass index is 21.95 kg/m². Physical Exam  Vitals and nursing note reviewed. Constitutional:       General: He is not in acute distress. Appearance: He is well-developed. He is not diaphoretic. HENT:      Head: Normocephalic and atraumatic. Eyes:      General: No scleral icterus. Pupils: Pupils are equal, round, and reactive to light. Neck:      Thyroid: No thyromegaly. Vascular: No JVD. Trachea: No tracheal deviation. Cardiovascular:      Rate and Rhythm: Normal rate and regular rhythm. Heart sounds: Normal heart sounds. No murmur heard. Pulmonary:      Effort: Pulmonary effort is normal. No respiratory distress. Breath sounds: Normal breath sounds. No wheezing. Abdominal:      General: Bowel sounds are normal. There is no distension. Palpations: Abdomen is soft. There is no mass. Tenderness: There is no abdominal tenderness. There is no guarding or rebound. Musculoskeletal:         General: No tenderness. Normal range of motion. Cervical back: Normal range of motion and neck supple. Skin:     General: Skin is warm. Coloration: Skin is not pale. Findings: No erythema or rash. Comments: He is not diaphoretic   Neurological:      Mental Status: He is alert and oriented to person, place, and time. Deep Tendon Reflexes: Reflexes are normal and symmetric.    Psychiatric:         Behavior: Behavior normal.         Thought Content: Thought content normal.         Judgment: Judgment normal.           IMPRESSION: Mr. Hallie Jeter is a 24 y.o. male with      Diagnosis Orders   1. IBD (inflammatory bowel disease)     2. Perianal abscess       Patient seems to be in clinical and endoscopic remission at this time  And most likely mucosal remission  We will continue with the same treatment for now  Is following with the [facility] to make a decision on the management of the perianal remanent of the abscess/fistula    Diet/life style/natural hx /complication of the dx were all explained in details   Past medical, past surgical, social history, psychiatric history, medications or allergies, all reviewed and  updated    Thank you for allowing me to participate in the care of Mr. Hallie Jeter. For any further questions please do not hesitate to contact me. I have reviewed and agree with the ROS entered by the MA/RN. Note is dictated utilizing voice recognition software. Unfortunately this leads to occasional typographical errors. Please contact our office if you have any questions.       Heidi Conde MD  [facility]  O: #[phone] [facility]

## 2022-01-28 ENCOUNTER — HOSPITAL ENCOUNTER (OUTPATIENT)
Dept: INFUSION THERAPY | Facility: MEDICAL CENTER | Age: 22
Discharge: HOME | End: 2022-01-28
Payer: MEDICARE

## 2022-01-28 VITALS
SYSTOLIC BLOOD PRESSURE: 128 MMHG | DIASTOLIC BLOOD PRESSURE: 65 MMHG | TEMPERATURE: 97.8 F | HEART RATE: 69 BPM | RESPIRATION RATE: 16 BRPM

## 2022-01-28 DIAGNOSIS — K52.9 IBD (INFLAMMATORY BOWEL DISEASE): Primary | ICD-10-CM

## 2022-01-28 PROCEDURE — 6360000002 HC RX W HCPCS: Performed by: INTERNAL MEDICINE

## 2022-01-28 PROCEDURE — 2580000003 HC RX 258: Performed by: INTERNAL MEDICINE

## 2022-01-28 PROCEDURE — 96413 CHEMO IV INFUSION 1 HR: CPT

## 2022-01-28 PROCEDURE — 96415 CHEMO IV INFUSION ADDL HR: CPT

## 2022-01-28 PROCEDURE — 6370000000 HC RX 637 (ALT 250 FOR IP): Performed by: INTERNAL MEDICINE

## 2022-01-28 RX ORDER — SODIUM CHLORIDE 9 MG/ML
25 INJECTION, SOLUTION INTRAVENOUS PRN
Status: CANCELLED | OUTPATIENT
Start: 2022-03-25

## 2022-01-28 RX ORDER — SODIUM CHLORIDE 9 MG/ML
INJECTION, SOLUTION INTRAVENOUS ONCE
Status: CANCELLED | OUTPATIENT
Start: 2022-03-25 | End: 2022-03-25

## 2022-01-28 RX ORDER — SODIUM CHLORIDE 9 MG/ML
INJECTION, SOLUTION INTRAVENOUS CONTINUOUS
Status: CANCELLED | OUTPATIENT
Start: 2022-03-25

## 2022-01-28 RX ORDER — ACETAMINOPHEN 325 MG/1
650 TABLET ORAL ONCE
Status: CANCELLED | OUTPATIENT
Start: 2022-03-25

## 2022-01-28 RX ORDER — SODIUM CHLORIDE 9 MG/ML
INJECTION, SOLUTION INTRAVENOUS ONCE
Status: COMPLETED | OUTPATIENT
Start: 2022-01-28 | End: 2022-01-28

## 2022-01-28 RX ORDER — ACETAMINOPHEN 325 MG/1
650 TABLET ORAL ONCE
Status: COMPLETED | OUTPATIENT
Start: 2022-01-28 | End: 2022-01-28

## 2022-01-28 RX ORDER — HEPARIN SODIUM (PORCINE) LOCK FLUSH IV SOLN 100 UNIT/ML 100 UNIT/ML
500 SOLUTION INTRAVENOUS PRN
Status: CANCELLED | OUTPATIENT
Start: 2022-03-25

## 2022-01-28 RX ORDER — DIPHENHYDRAMINE HCL 25 MG
25 TABLET ORAL ONCE
Status: CANCELLED | OUTPATIENT
Start: 2022-03-25

## 2022-01-28 RX ORDER — DIPHENHYDRAMINE HCL 25 MG
25 TABLET ORAL ONCE
Status: COMPLETED | OUTPATIENT
Start: 2022-01-28 | End: 2022-01-28

## 2022-01-28 RX ORDER — DIPHENHYDRAMINE HYDROCHLORIDE 50 MG/ML
50 INJECTION INTRAMUSCULAR; INTRAVENOUS ONCE
Status: CANCELLED | OUTPATIENT
Start: 2022-03-25 | End: 2022-03-25

## 2022-01-28 RX ORDER — METHYLPREDNISOLONE SODIUM SUCCINATE 125 MG/2ML
125 INJECTION, POWDER, LYOPHILIZED, FOR SOLUTION INTRAMUSCULAR; INTRAVENOUS ONCE
Status: CANCELLED | OUTPATIENT
Start: 2022-03-25 | End: 2022-03-25

## 2022-01-28 RX ORDER — SODIUM CHLORIDE 0.9 % (FLUSH) 0.9 %
5-40 SYRINGE (ML) INJECTION PRN
Status: CANCELLED | OUTPATIENT
Start: 2022-03-25

## 2022-01-28 RX ADMIN — INFLIXIMAB 300 MG: 100 INJECTION, POWDER, LYOPHILIZED, FOR SOLUTION INTRAVENOUS at 10:55

## 2022-01-28 RX ADMIN — DIPHENHYDRAMINE HCL 25 MG: 25 TABLET ORAL at 11:38

## 2022-01-28 RX ADMIN — ACETAMINOPHEN 650 MG: 325 TABLET ORAL at 11:38

## 2022-01-28 RX ADMIN — SODIUM CHLORIDE: 9 INJECTION, SOLUTION INTRAVENOUS at 10:45

## 2022-01-28 ASSESSMENT — PAIN SCALES - GENERAL: PAINLEVEL_OUTOF10: 0

## 2022-01-28 NOTE — PROGRESS NOTES
Pt  arrive ambulatory for remicade infusion. Denies complaints or concerns. States has no sign or symptoms of infection. Vitals as charted. Peripheral IV established to right hand per policy. Remicade infusion started . Remicade infusing at 40 ml/Hr   Writer stopped infusion , pt asked about premed , writing thought pt said he took pre-meds at home . Pt states that he did not take them at home . Pt premedicated . Infusion restarted , Remicade infused with no adverse reactions; see MAR for titration and epic flowsheet for stable vital.  Line flushed for 30 minutes post infusion with no adverse reaction noted. Intact IV catheter removed and pressure dressing applied. Patient  discharge.

## 2022-01-28 NOTE — PLAN OF CARE
Problem: SAFETY  Goal: Free from accidental physical injury  1/28/2022 1235 by Christianne Adams RN  Outcome: Completed  1/28/2022 1235 by Christianne Adams RN  Outcome: Met This Shift

## 2022-03-25 ENCOUNTER — HOSPITAL ENCOUNTER (OUTPATIENT)
Dept: INFUSION THERAPY | Facility: MEDICAL CENTER | Age: 22
Discharge: HOME | End: 2022-03-25
Payer: MEDICARE

## 2022-03-25 VITALS
SYSTOLIC BLOOD PRESSURE: 122 MMHG | HEART RATE: 68 BPM | TEMPERATURE: 97.4 F | DIASTOLIC BLOOD PRESSURE: 66 MMHG | RESPIRATION RATE: 18 BRPM

## 2022-03-25 DIAGNOSIS — K52.9 IBD (INFLAMMATORY BOWEL DISEASE): Primary | ICD-10-CM

## 2022-03-25 PROCEDURE — 96413 CHEMO IV INFUSION 1 HR: CPT

## 2022-03-25 PROCEDURE — 2580000003 HC RX 258: Performed by: INTERNAL MEDICINE

## 2022-03-25 PROCEDURE — 6370000000 HC RX 637 (ALT 250 FOR IP): Performed by: INTERNAL MEDICINE

## 2022-03-25 PROCEDURE — 6360000002 HC RX W HCPCS: Performed by: INTERNAL MEDICINE

## 2022-03-25 PROCEDURE — 96415 CHEMO IV INFUSION ADDL HR: CPT

## 2022-03-25 RX ORDER — DIPHENHYDRAMINE HYDROCHLORIDE 50 MG/ML
50 INJECTION INTRAMUSCULAR; INTRAVENOUS ONCE
Status: CANCELLED | OUTPATIENT
Start: 2022-05-20 | End: 2022-05-20

## 2022-03-25 RX ORDER — SODIUM CHLORIDE 9 MG/ML
INJECTION, SOLUTION INTRAVENOUS CONTINUOUS
Status: CANCELLED | OUTPATIENT
Start: 2022-05-20

## 2022-03-25 RX ORDER — SODIUM CHLORIDE 9 MG/ML
25 INJECTION, SOLUTION INTRAVENOUS PRN
Status: CANCELLED | OUTPATIENT
Start: 2022-05-20

## 2022-03-25 RX ORDER — ACETAMINOPHEN 325 MG/1
650 TABLET ORAL ONCE
Status: CANCELLED | OUTPATIENT
Start: 2022-05-20

## 2022-03-25 RX ORDER — HEPARIN SODIUM (PORCINE) LOCK FLUSH IV SOLN 100 UNIT/ML 100 UNIT/ML
500 SOLUTION INTRAVENOUS PRN
Status: CANCELLED | OUTPATIENT
Start: 2022-05-20

## 2022-03-25 RX ORDER — DIPHENHYDRAMINE HCL 25 MG
25 TABLET ORAL ONCE
Status: CANCELLED | OUTPATIENT
Start: 2022-05-20

## 2022-03-25 RX ORDER — DIPHENHYDRAMINE HCL 25 MG
25 TABLET ORAL ONCE
Status: COMPLETED | OUTPATIENT
Start: 2022-03-25 | End: 2022-03-25

## 2022-03-25 RX ORDER — SODIUM CHLORIDE 9 MG/ML
INJECTION, SOLUTION INTRAVENOUS ONCE
Status: CANCELLED | OUTPATIENT
Start: 2022-05-20 | End: 2022-05-20

## 2022-03-25 RX ORDER — SODIUM CHLORIDE 9 MG/ML
INJECTION, SOLUTION INTRAVENOUS ONCE
Status: COMPLETED | OUTPATIENT
Start: 2022-03-25 | End: 2022-03-25

## 2022-03-25 RX ORDER — METHYLPREDNISOLONE SODIUM SUCCINATE 125 MG/2ML
125 INJECTION, POWDER, LYOPHILIZED, FOR SOLUTION INTRAMUSCULAR; INTRAVENOUS ONCE
Status: CANCELLED | OUTPATIENT
Start: 2022-05-20 | End: 2022-05-20

## 2022-03-25 RX ORDER — SODIUM CHLORIDE 0.9 % (FLUSH) 0.9 %
5-40 SYRINGE (ML) INJECTION PRN
Status: CANCELLED | OUTPATIENT
Start: 2022-05-20

## 2022-03-25 RX ORDER — ACETAMINOPHEN 325 MG/1
650 TABLET ORAL ONCE
Status: COMPLETED | OUTPATIENT
Start: 2022-03-25 | End: 2022-03-25

## 2022-03-25 RX ADMIN — INFLIXIMAB 300 MG: 100 INJECTION, POWDER, LYOPHILIZED, FOR SOLUTION INTRAVENOUS at 09:06

## 2022-03-25 RX ADMIN — ACETAMINOPHEN 650 MG: 325 TABLET ORAL at 08:26

## 2022-03-25 RX ADMIN — DIPHENHYDRAMINE HYDROCHLORIDE 25 MG: 25 TABLET ORAL at 08:26

## 2022-03-25 RX ADMIN — SODIUM CHLORIDE: 9 INJECTION, SOLUTION INTRAVENOUS at 08:33

## 2022-03-25 ASSESSMENT — PAIN SCALES - GENERAL: PAINLEVEL_OUTOF10: 0

## 2022-03-25 NOTE — PROGRESS NOTES
Patient arrive ambulatory for Renflexis infusion. Patient states he can tell he is due for infusion, but states no major flare-ups. Denies other complaint or concern. Vitals as charted. Patient premedicated. Peripheral IV established per policy. Renflexis infused with no sign of adverse reaction; line flushed. Patient monitored post infusion. Stable vitals as charted throughout and post infusion. Intact IV catheter removed; pressure dressing applied. Patient ambulate off unit per self at discharge; stopping at front to schedule future appointment.

## 2022-04-06 ENCOUNTER — OFFICE VISIT (OUTPATIENT)
Dept: GASTROENTEROLOGY | Age: 22
End: 2022-04-06
Payer: MEDICARE

## 2022-04-06 ENCOUNTER — HOSPITAL ENCOUNTER (OUTPATIENT)
Age: 22
Setting detail: SPECIMEN
Discharge: HOME | End: 2022-04-06

## 2022-04-06 VITALS
TEMPERATURE: 97.2 F | WEIGHT: 152 LBS | BODY MASS INDEX: 21.81 KG/M2 | HEART RATE: 72 BPM | SYSTOLIC BLOOD PRESSURE: 115 MMHG | DIASTOLIC BLOOD PRESSURE: 66 MMHG

## 2022-04-06 DIAGNOSIS — K61.0 PERIANAL ABSCESS: ICD-10-CM

## 2022-04-06 DIAGNOSIS — K52.9 IBD (INFLAMMATORY BOWEL DISEASE): Primary | ICD-10-CM

## 2022-04-06 DIAGNOSIS — K52.9 IBD (INFLAMMATORY BOWEL DISEASE): ICD-10-CM

## 2022-04-06 LAB
ALBUMIN SERPL-MCNC: 5.1 G/DL (ref 3.5–5.2)
ALBUMIN/GLOBULIN RATIO: 1.9 (ref 1–2.5)
ALP BLD-CCNC: 84 U/L (ref 40–129)
ALT SERPL-CCNC: 14 U/L (ref 5–41)
AST SERPL-CCNC: 16 U/L
BILIRUB SERPL-MCNC: 0.77 MG/DL (ref 0.3–1.2)
BILIRUBIN DIRECT: 0.17 MG/DL
BILIRUBIN, INDIRECT: 0.6 MG/DL (ref 0–1)
FOLATE: 10.2 NG/ML
HCT VFR BLD CALC: 48.4 % (ref 40.7–50.3)
HEMOGLOBIN: 16 G/DL (ref 13–17)
IRON SATURATION: 33 % (ref 20–55)
IRON: 119 UG/DL (ref 59–158)
MCH RBC QN AUTO: 31.7 PG (ref 25.2–33.5)
MCHC RBC AUTO-ENTMCNC: 33.1 G/DL (ref 28.4–34.8)
MCV RBC AUTO: 95.8 FL (ref 82.6–102.9)
NRBC AUTOMATED: 0 PER 100 WBC
PDW BLD-RTO: 10.9 % (ref 11.8–14.4)
PLATELET # BLD: 349 K/UL (ref 138–453)
PMV BLD AUTO: 9.7 FL (ref 8.1–13.5)
RBC # BLD: 5.05 M/UL (ref 4.21–5.77)
TOTAL IRON BINDING CAPACITY: 363 UG/DL (ref 250–450)
TOTAL PROTEIN: 7.8 G/DL (ref 6.4–8.3)
UNSATURATED IRON BINDING CAPACITY: 244 UG/DL (ref 112–347)
VITAMIN B-12: 390 PG/ML (ref 232–1245)
WBC # BLD: 8.5 K/UL (ref 4.5–13.5)

## 2022-04-06 PROCEDURE — G8427 DOCREV CUR MEDS BY ELIG CLIN: HCPCS | Performed by: INTERNAL MEDICINE

## 2022-04-06 PROCEDURE — G8420 CALC BMI NORM PARAMETERS: HCPCS | Performed by: INTERNAL MEDICINE

## 2022-04-06 PROCEDURE — 1036F TOBACCO NON-USER: CPT | Performed by: INTERNAL MEDICINE

## 2022-04-06 PROCEDURE — 99214 OFFICE O/P EST MOD 30 MIN: CPT | Performed by: INTERNAL MEDICINE

## 2022-04-06 ASSESSMENT — ENCOUNTER SYMPTOMS
WHEEZING: 0
ABDOMINAL DISTENTION: 0
ANAL BLEEDING: 0
TROUBLE SWALLOWING: 0
CHOKING: 0
ABDOMINAL PAIN: 0
VOMITING: 0
RECTAL PAIN: 0
NAUSEA: 0
BLOOD IN STOOL: 0
COUGH: 0
CONSTIPATION: 0
DIARRHEA: 0
SHORTNESS OF BREATH: 0

## 2022-04-06 NOTE — PROGRESS NOTES
GI CLINIC FOLLOW UP    INTERVAL HISTORY:   No referring provider defined for this encounter. Chief Complaint   Patient presents with    Inflammatory Bowel Disease     Patient is f/u on Crohn's. He is currently following [facility] as well. He states when he gets close to next Remicade infusion, he sees a little more of the liquid from the fistula that leaks. Pt also c/o urgency. HISTORY OF PRESENT ILLNESS: Adilson Worthington is a 24 y.o. male , referred for evaluation of*  IBD, fistulas and perianal abscess history  Patient is here for follow-up\  Doing very well  He does have some mucus per rectum and the week before his next Remicade shot  And that is well controlled he is feeling a lot better he is accomplishing more is gaining weight he is eating and drinking normally  No significant symptoms to indicate fistula at this time  He was seen at the [facility] clinic and he was supposed to see the fistula specialist he was told for possible stem cells treatment which is not very familiar with. He denied any cramps pain bleeding  Denied any eyes vision, arthritis, back pain, skin lesions  Liver enzymes have been normal on him      Past Medical,Family, and Social History reviewed and does contribute to the patient presentingcondition. Patient's PMH/PSH,SH,PSYCH Hx, MEDs, ALLERGIES, and ROS were all reviewed and updated in the appropriate sections.     PAST MEDICAL HISTORY:  Past Medical History:   Diagnosis Date    Anal fissure     Colitis     Crohn's colitis ([address]     Meagan-rectal abscess     Rectal pain        Past Surgical History:   Procedure Laterality Date    COLONOSCOPY  03/08/2021    COLONOSCOPY N/A 4/1/2021    COLONOSCOPY WITH BIOPSY performed by Jermaine Harris MD at [address] [place] [address]  12/22/2020    rectal abcess excision, Dr. Pro Finger N/A 8/5/2021    Marlee Garrido performed by Jermaine Harris MD at [address] [place] [address]        CURRENT MEDICATIONS:    Current Outpatient Medications:     hyoscyamine (ANASPAZ;LEVSIN) 125 MCG tablet, Take 0.125 mg by mouth every 6 hours as needed, Disp: , Rfl:     inFLIXimab (REMICADE) 100 MG injection, Infuse 5 mg/kg intravenously See Admin Instructions Every other month, Disp: , Rfl:     acetaminophen (TYLENOL) 500 MG tablet, Take 500 mg by mouth every 6 hours as needed for Pain, Disp: , Rfl:     ALLERGIES:   No Known Allergies    FAMILY HISTORY:       Problem Relation Age of Onset    Irritable Bowel Syndrome Mother     Diabetes Father          SOCIAL HISTORY:   Social History     Socioeconomic History    Marital status: Single     Spouse name: Not on file    Number of children: Not on file    Years of education: Not on file    Highest education level: Not on file   Occupational History    Not on file   Tobacco Use    Smoking status: Former Smoker     Quit date: 10/6/2021     Years since quittin.4    Smokeless tobacco: Never Used    Tobacco comment: 1-2 cigarettes per month   Vaping Use    Vaping Use: Every day    Substances: Nicotine, Flavoring    Devices: Disposable   Substance and Sexual Activity    Alcohol use: Yes     Comment: occasional    Drug use: Never    Sexual activity: Not Currently   Other Topics Concern    Not on file   Social History Narrative    Not on file     Social Determinants of Health     Financial Resource Strain:     Difficulty of Paying Living Expenses: Not on file   Food Insecurity:     Worried About Running Out of Food in the Last Year: Not on file    Joan of Food in the Last Year: Not on file   Transportation Needs:     Lack of Transportation (Medical): Not on file    Lack of Transportation (Non-Medical):  Not on file   Physical Activity:     Days of Exercise per Week: Not on file    Minutes of Exercise per Session: Not on file   Stress:     Feeling of Stress : Not on file   Social Connections:     Frequency of Communication with Friends and Family: Not on file    Frequency of Social Gatherings with Friends and Family: Not on file    Attends Religion Services: Not on file    Active Member of Clubs or Organizations: Not on file    Attends Club or Organization Meetings: Not on file    Marital Status: Not on file   Intimate Partner Violence:     Fear of Current or Ex-Partner: Not on file    Emotionally Abused: Not on file    Physically Abused: Not on file    Sexually Abused: Not on file   Housing Stability:     Unable to Pay for Housing in the Last Year: Not on file    Number of [place] in the Last Year: Not on file    Unstable Housing in the Last Year: Not on file       REVIEW OF SYSTEMS: A 12-point review of systemswas obtained and pertinent positives and negatives were enumerated above in the history of present illness. All other reviewed systems / symptoms were negative. Review of Systems   Constitutional: Negative for appetite change, fatigue and unexpected weight change. HENT: Negative for mouth sores and trouble swallowing. Eyes: Positive for visual disturbance (glasses). Respiratory: Negative for cough, choking, shortness of breath and wheezing. Cardiovascular: Negative for chest pain, palpitations and leg swelling. Gastrointestinal: Negative for abdominal distention, abdominal pain, anal bleeding, blood in stool, constipation, diarrhea (loose), nausea, rectal pain and vomiting. Genitourinary: Negative for difficulty urinating. Allergic/Immunologic: Negative for environmental allergies and food allergies. Neurological: Negative for dizziness, weakness, light-headedness, numbness and headaches. Hematological: Does not bruise/bleed easily. Psychiatric/Behavioral: Negative for sleep disturbance. The patient is not nervous/anxious.             LABORATORY DATA: Reviewed  No results found for: WBC, HGB, HCT, MCV, PLT, NA, K, CL, CO2, BUN, CREATININE, LABPROT, LABALBU, GGT, BILITOT, ALKPHOS, AST, ALT, INR      No results found for: RBC, HGB, MCV, MCH, MCHC, RDW, MPV, BASOPCT, LYMPHSABS, MONOSABS, NEUTROABS, EOSABS, BASOSABS      DIAGNOSTIC TESTING:     No results found. PHYSICAL EXAMINATION: Vital signs reviewed per the nursing documentation. /66   Pulse 72   Temp 97.2 °F (36.2 °C)   Wt 152 lb (68.9 kg)   BMI 21.81 kg/m²   Body mass index is 21.81 kg/m². Physical Exam  Vitals and nursing note reviewed. Constitutional:       General: He is not in acute distress. Appearance: He is well-developed. He is not diaphoretic. HENT:      Head: Normocephalic and atraumatic. Eyes:      General: No scleral icterus. Pupils: Pupils are equal, round, and reactive to light. Neck:      Thyroid: No thyromegaly. Vascular: No JVD. Trachea: No tracheal deviation. Cardiovascular:      Rate and Rhythm: Normal rate and regular rhythm. Heart sounds: Normal heart sounds. No murmur heard. Pulmonary:      Effort: Pulmonary effort is normal. No respiratory distress. Breath sounds: Normal breath sounds. No wheezing. Abdominal:      General: Bowel sounds are normal. There is no distension. Palpations: Abdomen is soft. There is no mass. Tenderness: There is no abdominal tenderness. There is no guarding or rebound. Musculoskeletal:         General: No tenderness. Normal range of motion. Cervical back: Normal range of motion and neck supple. Skin:     General: Skin is warm. Coloration: Skin is not pale. Findings: No erythema or rash. Comments: He is not diaphoretic   Neurological:      Mental Status: He is alert and oriented to person, place, and time. Deep Tendon Reflexes: Reflexes are normal and symmetric. Psychiatric:         Behavior: Behavior normal.         Thought Content: Thought content normal.         Judgment: Judgment normal.           IMPRESSION: Mr. Brennan Monzon is a 24 y.o. male with      Diagnosis Orders   1.  IBD (inflammatory bowel disease)  Iron and TIBC    Vitamin B12    Folate    CBC    Hepatic Function Panel   2. Perianal abscess  Iron and TIBC    Vitamin B12    Folate    CBC    Hepatic Function Panel     Continue with Remicade  Proceed with the above  Follow with the [facility] [employer] clinic with the fistula specialist    Diet/life style/natural hx /complication of the dx were all explained in details   Past medical, past surgical, social history, psychiatric history, medications or allergies, all reviewed and  updated    Thank you for allowing me to participate in the care of Mr. Hafsa Norwood. For any further questions please do not hesitate to contact me. I have reviewed and agree with the ROS entered by the MA/RN. Note is dictated utilizing voice recognition software. Unfortunately this leads to occasional typographical errors. Please contact our office if you have any questions.       Lonell Simmonds, MD  [facility]  O: #[phone]

## 2022-04-17 ENCOUNTER — HOSPITAL ENCOUNTER (EMERGENCY)
Age: 22
Discharge: HOME | End: 2022-04-17
Attending: EMERGENCY MEDICINE
Payer: MEDICARE

## 2022-04-17 VITALS
RESPIRATION RATE: 17 BRPM | HEIGHT: 70 IN | HEART RATE: 80 BPM | DIASTOLIC BLOOD PRESSURE: 69 MMHG | BODY MASS INDEX: 21.9 KG/M2 | TEMPERATURE: 98.4 F | OXYGEN SATURATION: 100 % | SYSTOLIC BLOOD PRESSURE: 136 MMHG | WEIGHT: 153 LBS

## 2022-04-17 DIAGNOSIS — Z20.6 HIV EXPOSURE: Primary | ICD-10-CM

## 2022-04-17 LAB
-: ABNORMAL
BILIRUBIN URINE: NEGATIVE
COLOR: YELLOW
CRYSTALS, UA: ABNORMAL /HPF
EPITHELIAL CELLS UA: ABNORMAL /HPF (ref 0–5)
GLUCOSE URINE: NEGATIVE
HIV AG/AB: NONREACTIVE
KETONES, URINE: NEGATIVE
LEUKOCYTE ESTERASE, URINE: NEGATIVE
MUCUS: ABNORMAL
NITRITE, URINE: NEGATIVE
PH UA: 6.5 (ref 5–8)
PROTEIN UA: NEGATIVE
RBC UA: ABNORMAL /HPF (ref 0–2)
SPECIFIC GRAVITY UA: 1.01 (ref 1–1.03)
TURBIDITY: CLEAR
URINE HGB: ABNORMAL
UROBILINOGEN, URINE: NORMAL
WBC UA: ABNORMAL /HPF (ref 0–5)

## 2022-04-17 PROCEDURE — 99281 EMR DPT VST MAYX REQ PHY/QHP: CPT

## 2022-04-17 PROCEDURE — 87389 HIV-1 AG W/HIV-1&-2 AB AG IA: CPT

## 2022-04-17 PROCEDURE — 87491 CHLMYD TRACH DNA AMP PROBE: CPT

## 2022-04-17 PROCEDURE — 99282 EMERGENCY DEPT VISIT SF MDM: CPT

## 2022-04-17 PROCEDURE — 87591 N.GONORRHOEAE DNA AMP PROB: CPT

## 2022-04-17 PROCEDURE — 81001 URINALYSIS AUTO W/SCOPE: CPT

## 2022-04-17 RX ORDER — EMTRICITABINE AND TENOFOVIR DISOPROXIL FUMARATE 200; 300 MG/1; MG/1
1 TABLET, FILM COATED ORAL DAILY
Qty: 28 TABLET | Refills: 0 | Status: SHIPPED | OUTPATIENT
Start: 2022-04-17

## 2022-04-17 NOTE — ED NOTES
Pt to ed for evaluation for STDs. Pt ambulatory to room. Pt states he had unprotected sex last week and would like to be evaluated for STDs and HIV. Pt states HIV status of partner unknown. Pt denies any symptoms at this time. Respirations equal and non labored. Will continue to monitor. Pt given call light. Bed locked and in lowest position.        [facility]  04/17/22 8477

## 2022-04-17 NOTE — ED PROVIDER NOTES
eMERGENCY dEPARTMENT eNCOUnter   Independent Attestation     Pt Name: Brock Trevizo  MRN: [id number]  [place] [address]  Date of evaluation: 4/17/22     Brock Trevizo is a 24 y.o. male with CC: Exposure to STD (Wants STD check and treated for HIV prevention; unprotected sex and pt report's the HIV status is uknown )      This visit was performed by both a physician and an APC. I performed all aspects of the MDM as documented. Based on the medical record the care appears appropriate. I was personally available for consultation in the Emergency Department.     The care is provided during an unprecedented national emergency due to the novel coronavirus, [place] MD  Attending Emergency Physician                    Sylvain Hinkle MD  04/17/22 1566

## 2022-04-18 LAB
C. TRACHOMATIS DNA ,URINE: NEGATIVE
N. GONORRHOEAE DNA, URINE: NEGATIVE
SPECIMEN DESCRIPTION: NORMAL

## 2022-04-18 NOTE — ED PROVIDER NOTES
[facility] ED  eMERGENCY dEPARTMENTeNCOUnter      Pt Name: Elier Monsivais  MRN: [id number]  [place] [address]  Date ofevaluation: 4/17/2022  Provider: Nita Quinonez PA-C    CHIEF COMPLAINT       Chief Complaint   Patient presents with    Exposure to STD     Wants STD check and treated for HIV prevention; unprotected sex and pt report's the HIV status is uknown          HISTORY OF PRESENT ILLNESS  (Location/Symptom, Timing/Onset, Context/Setting, Quality, Duration, Modifying Factors, Severity.)   Elier Monsivais is a 24 y.o. male who presents to the emergency department with with STD check and HIV prophylaxis. Patient was unprotected sex last night with another male. His wife around 89. He is inquiring about HIV prophylaxis. Denies any symptoms. His sexual partner status is unknown. He was verbally told that department was negative. Nursing Notes were reviewed. ALLERGIES     Patient has no known allergies.     CURRENT MEDICATIONS       Discharge Medication List as of 4/17/2022  3:41 PM      CONTINUE these medications which have NOT CHANGED    Details   hyoscyamine (ANASPAZ;LEVSIN) 125 MCG tablet Take 0.125 mg by mouth every 6 hours as neededHistorical Med      inFLIXimab (REMICADE) 100 MG injection Infuse 5 mg/kg intravenously See Admin Instructions Every other monthHistorical Med      acetaminophen (TYLENOL) 500 MG tablet Take 500 mg by mouth every 6 hours as needed for PainHistorical Med             PAST MEDICAL HISTORY         Diagnosis Date    Anal fissure     Colitis     Crohn's colitis ([address]     Meagan-rectal abscess     Rectal pain        SURGICAL HISTORY           Procedure Laterality Date    COLONOSCOPY  03/08/2021    COLONOSCOPY N/A 4/1/2021    COLONOSCOPY WITH BIOPSY performed by Shaniqua Medina MD at [address] [place] [address]  12/22/2020    rectal abcess excision, Dr. Yoana Holt 8/5/2021    [facility] performed by Zainab Lewis MD at [address] [facility]  2006         FAMILY HISTORY           Problem Relation Age of Onset    Irritable Bowel Syndrome Mother     Diabetes Father      Family Status   Relation Name Status    Mother  Alive    Father  Alive        SOCIAL HISTORY      reports that he quit smoking about 6 months ago. He has never used smokeless tobacco. He reports current alcohol use. He reports that he does not use drugs. REVIEW OFSYSTEMS    (2-9 systems for level 4, 10 or more for level 5)   Review of Systems    Except as noted above the remainder of the review of systems was reviewed and negative. PHYSICAL EXAM    (up to 7 for level 4, 8 or more for level 5)     ED Triage Vitals   BP Temp Temp src Pulse Resp SpO2 Height Weight   04/17/22 1321 04/17/22 1319 -- 04/17/22 1319 04/17/22 1319 04/17/22 1321 04/17/22 1319 04/17/22 1319   136/69 98.4 °F (36.9 °C)  80 17 100 % 5' 10\" (1.778 m) 153 lb (69.4 kg)      Physical Exam  Constitutional:       Appearance: He is well-developed. HENT:      Head: Normocephalic and atraumatic. Cardiovascular:      Rate and Rhythm: Normal rate and regular rhythm. Pulmonary:      Effort: Pulmonary effort is normal.      Breath sounds: Normal breath sounds. Abdominal:      Palpations: Abdomen is soft. Musculoskeletal:         General: Normal range of motion. Cervical back: Normal range of motion and neck supple. Skin:     General: Skin is warm. Neurological:      Mental Status: He is alert and oriented to person, place, and time.    Psychiatric:         Behavior: Behavior normal.                 DIAGNOSTIC RESULTS     EKG: All EKG's are interpreted by the Emergency Department Physician who either signs or Co-signs this chart in the absence of a cardiologist.        RADIOLOGY:   Non-plain film images such as CT, Ultrasound and MRI are read by the radiologist. Plain radiographic images arevisualized and preliminarily interpreted by the emergency physician with the below findings:        Interpretation per the Radiologist below, if available at thetime of this note:          ED BEDSIDE ULTRASOUND:   Performed by ED Physician - none    LABS:  Labs Reviewed   URINALYSIS WITH REFLEX TO CULTURE - Abnormal; Notable for the following components:       Result Value    Urine Hgb 1+ (*)     All other components within normal limits   MICROSCOPIC URINALYSIS - Abnormal; Notable for the following components:    Crystals, UA 0 TO 2 CALCIUM OXALATE (*)     Mucus, UA 1+ (*)     All other components within normal limits   C.TRACHOMATIS N.GONORRHOEAE DNA, URINE   HIV SCREEN       All other labs were within normal range or not returned as of this dictation. EMERGENCY DEPARTMENT COURSE and DIFFERENTIAL DIAGNOSIS/MDM:   Vitals:    Vitals:    04/17/22 1319 04/17/22 1321   BP:  136/69   Pulse: 80    Resp: 17    Temp: 98.4 °F (36.9 °C)    SpO2:  100%   Weight: 153 lb (69.4 kg)    Height: 5' 10\" (1.778 m)      Infectious disease consulted spoke with dr. Keri Gomes. She recommends truvada 200/300 daily for 28 days tivicay 50 mg daily for 28 days and outpatient follow up.       CONSULTS:  IP CONSULT TO INFECTIOUS DISEASES    PROCEDURES:  Procedures        FINAL IMPRESSION      1. HIV exposure          DISPOSITION/PLAN   DISPOSITION Decision To Discharge 04/17/2022 03:39:19 PM      PATIENTREFERRED TO:   Millicent Stone MD  [address] [place], [place] [address]  [phone]    In 1 day        DISCHARGE MEDICATIONS:     Discharge Medication List as of 4/17/2022  3:41 PM      START taking these medications    Details   emtricitabine-tenofovir (TRUVADA) 200-300 MG per tablet Take 1 tablet by mouth daily, Disp-28 tablet, R-0Print      dolutegravir sodium (TIVICAY) 50 MG tablet Take 1 tablet by mouth daily, Disp-28 tablet, R-0Print                 (Please note that portions of this note were completed with a voice recognition program.  Efforts were made to edit thedictations but occasionally words are mis-transcribed.)    BEULAH Hare PA-C  04/17/22 8273

## 2022-04-21 ENCOUNTER — HOSPITAL ENCOUNTER (EMERGENCY)
Age: 22
Discharge: HOME | End: 2022-04-21
Attending: EMERGENCY MEDICINE
Payer: MEDICARE

## 2022-04-21 ENCOUNTER — APPOINTMENT (OUTPATIENT)
Dept: CT IMAGING | Age: 22
End: 2022-04-21
Payer: MEDICARE

## 2022-04-21 VITALS
TEMPERATURE: 98.3 F | OXYGEN SATURATION: 97 % | HEART RATE: 80 BPM | RESPIRATION RATE: 17 BRPM | DIASTOLIC BLOOD PRESSURE: 57 MMHG | SYSTOLIC BLOOD PRESSURE: 117 MMHG

## 2022-04-21 DIAGNOSIS — M79.18 BUTTOCK PAIN: Primary | ICD-10-CM

## 2022-04-21 LAB
ABSOLUTE EOS #: 0.07 K/UL (ref 0–0.44)
ABSOLUTE IMMATURE GRANULOCYTE: 0.03 K/UL (ref 0–0.3)
ABSOLUTE LYMPH #: 2.07 K/UL (ref 1.1–3.7)
ABSOLUTE MONO #: 0.57 K/UL (ref 0.1–1.4)
ANION GAP SERPL CALCULATED.3IONS-SCNC: 9 MMOL/L (ref 9–17)
BASOPHILS # BLD: 0 % (ref 0–2)
BASOPHILS ABSOLUTE: 0.03 K/UL (ref 0–0.2)
BUN BLDV-MCNC: 7 MG/DL (ref 6–20)
BUN/CREAT BLD: 8 (ref 9–20)
CALCIUM SERPL-MCNC: 9.5 MG/DL (ref 8.6–10.4)
CHLORIDE BLD-SCNC: 104 MMOL/L (ref 98–107)
CO2: 27 MMOL/L (ref 20–31)
CREAT SERPL-MCNC: 0.92 MG/DL (ref 0.7–1.2)
EOSINOPHILS RELATIVE PERCENT: 1 % (ref 1–4)
GFR AFRICAN AMERICAN: >60 ML/MIN
GFR NON-AFRICAN AMERICAN: >60 ML/MIN
GFR SERPL CREATININE-BSD FRML MDRD: ABNORMAL ML/MIN/{1.73_M2}
GLUCOSE BLD-MCNC: 109 MG/DL (ref 70–99)
HCT VFR BLD CALC: 42.8 % (ref 40.7–50.3)
HEMOGLOBIN: 14 G/DL (ref 13–17)
IMMATURE GRANULOCYTES: 0 %
LYMPHOCYTES # BLD: 24 % (ref 25–45)
MCH RBC QN AUTO: 31 PG (ref 25.2–33.5)
MCHC RBC AUTO-ENTMCNC: 32.7 G/DL (ref 28.4–34.8)
MCV RBC AUTO: 94.9 FL (ref 82.6–102.9)
MONOCYTES # BLD: 7 % (ref 2–8)
NRBC AUTOMATED: 0 PER 100 WBC
PDW BLD-RTO: 10.9 % (ref 11.8–14.4)
PLATELET # BLD: 285 K/UL (ref 138–453)
PMV BLD AUTO: 8.9 FL (ref 8.1–13.5)
POTASSIUM SERPL-SCNC: 4 MMOL/L (ref 3.7–5.3)
RBC # BLD: 4.51 M/UL (ref 4.21–5.77)
SEG NEUTROPHILS: 68 % (ref 34–64)
SEGMENTED NEUTROPHILS ABSOLUTE COUNT: 5.74 K/UL (ref 1.5–8.1)
SODIUM BLD-SCNC: 140 MMOL/L (ref 135–144)
WBC # BLD: 8.5 K/UL (ref 4.5–13.5)

## 2022-04-21 PROCEDURE — 99285 EMERGENCY DEPT VISIT HI MDM: CPT

## 2022-04-21 PROCEDURE — 85025 COMPLETE CBC W/AUTO DIFF WBC: CPT

## 2022-04-21 PROCEDURE — 2580000003 HC RX 258: Performed by: EMERGENCY MEDICINE

## 2022-04-21 PROCEDURE — 80048 BASIC METABOLIC PNL TOTAL CA: CPT

## 2022-04-21 PROCEDURE — 74177 CT ABD & PELVIS W/CONTRAST: CPT

## 2022-04-21 PROCEDURE — 6360000004 HC RX CONTRAST MEDICATION: Performed by: EMERGENCY MEDICINE

## 2022-04-21 RX ORDER — 0.9 % SODIUM CHLORIDE 0.9 %
80 INTRAVENOUS SOLUTION INTRAVENOUS ONCE
Status: COMPLETED | OUTPATIENT
Start: 2022-04-21 | End: 2022-04-21

## 2022-04-21 RX ORDER — AMOXICILLIN AND CLAVULANATE POTASSIUM 875; 125 MG/1; MG/1
1 TABLET, FILM COATED ORAL 2 TIMES DAILY
Qty: 20 TABLET | Refills: 0 | Status: SHIPPED | OUTPATIENT
Start: 2022-04-21 | End: 2022-07-05 | Stop reason: SDUPTHER

## 2022-04-21 RX ORDER — SODIUM CHLORIDE 0.9 % (FLUSH) 0.9 %
10 SYRINGE (ML) INJECTION ONCE
Status: COMPLETED | OUTPATIENT
Start: 2022-04-21 | End: 2022-04-21

## 2022-04-21 RX ADMIN — SODIUM CHLORIDE 80 ML: 9 INJECTION, SOLUTION INTRAVENOUS at 17:55

## 2022-04-21 RX ADMIN — SODIUM CHLORIDE, PRESERVATIVE FREE 10 ML: 5 INJECTION INTRAVENOUS at 17:53

## 2022-04-21 RX ADMIN — IOPAMIDOL 75 ML: 755 INJECTION, SOLUTION INTRAVENOUS at 17:53

## 2022-04-21 ASSESSMENT — ENCOUNTER SYMPTOMS
EYE DISCHARGE: 0
COUGH: 0
VOMITING: 0
SHORTNESS OF BREATH: 0
CONSTIPATION: 0
COLOR CHANGE: 0
FACIAL SWELLING: 0
ABDOMINAL PAIN: 0
DIARRHEA: 0
EYE REDNESS: 0

## 2022-04-21 ASSESSMENT — PAIN - FUNCTIONAL ASSESSMENT: PAIN_FUNCTIONAL_ASSESSMENT: 0-10

## 2022-04-21 ASSESSMENT — PAIN SCALES - GENERAL: PAINLEVEL_OUTOF10: 8

## 2022-04-21 NOTE — ED NOTES
Patient presents to the er for concerns of fissure in the rectum. Patient verbalizing has concerns for anal leakage, due to has anal sex and leaking from his anus.       Chucho Santos RN  04/21/22 0545

## 2022-04-21 NOTE — ED PROVIDER NOTES
SIGMOIDOSCOPY DIAGNOSTIC FLEXIBLE performed by Trace Ramírez MD at [address] [facility]  2006         FAMILY HISTORY           Problem Relation Age of Onset    Irritable Bowel Syndrome Mother     Diabetes Father      Family Status   Relation Name Status    Mother  Alive    Father  Alive        SOCIAL HISTORY      reports that he quit smoking about 6 months ago. He has never used smokeless tobacco. He reports current alcohol use. He reports that he does not use drugs. REVIEW OF SYSTEMS    (2-9 systems for level 4, 10 or more for level 5)     Review of Systems   Constitutional: Negative for chills, fatigue and fever. HENT: Negative for congestion, ear discharge and facial swelling. Eyes: Negative for discharge and redness. Respiratory: Negative for cough and shortness of breath. Cardiovascular: Negative for chest pain. Gastrointestinal: Negative for abdominal pain, constipation, diarrhea and vomiting. Genitourinary: Negative for dysuria and hematuria. Musculoskeletal: Negative for arthralgias. Skin: Negative for color change and rash. Neurological: Negative for syncope, numbness and headaches. Hematological: Negative for adenopathy. Psychiatric/Behavioral: Negative for confusion. The patient is not nervous/anxious. Except as noted above the remainder of the review of systems was reviewed and negative. PHYSICAL EXAM    (up to 7 for level 4, 8 or more for level 5)     Vitals:    04/21/22 1554   BP: (!) 117/57   Pulse: 80   Resp: 17   Temp: 98.3 °F (36.8 °C)   TempSrc: Oral   SpO2: 97%       Physical Exam  Vitals reviewed. Constitutional:       General: He is not in acute distress. Appearance: He is well-developed. He is not diaphoretic. HENT:      Head: Normocephalic and atraumatic. Eyes:      General: No scleral icterus. Right eye: No discharge. Left eye: No discharge. Cardiovascular:      Rate and Rhythm: Normal rate and regular rhythm. Pulmonary:      Effort: Pulmonary effort is normal. No respiratory distress. Breath sounds: Normal breath sounds. No stridor. No wheezing or rales. Abdominal:      General: There is no distension. Palpations: Abdomen is soft. Tenderness: There is no abdominal tenderness. Genitourinary:     Comments: Perianal examination shows some mild firmness just to the right of the anus. There is no raised area and no erythema or drainage that is expressible at this point. Musculoskeletal:         General: Normal range of motion. Cervical back: Neck supple. Lymphadenopathy:      Cervical: No cervical adenopathy. Skin:     General: Skin is warm and dry. Findings: No erythema or rash. Neurological:      Mental Status: He is alert and oriented to person, place, and time. Psychiatric:         Behavior: Behavior normal.             DIAGNOSTIC RESULTS     EKG: All EKG's are interpreted by the Emergency Department Physician who either signs or Co-signs this chart in the absence of a cardiologist.    RADIOLOGY:   Non-plain film images such as CT, Ultrasound and MRI are read by the radiologist. Plain radiographic images are visualized and preliminarily interpreted by the emergency physician with the below findings:    Interpretation per the Radiologist below, if available at the time of this note:    CT ABDOMEN PELVIS W IV CONTRAST Additional Contrast? None    Result Date: 4/21/2022  EXAMINATION: CT OF THE ABDOMEN AND PELVIS WITH CONTRAST 4/21/2022 4:41 pm TECHNIQUE: CT of the abdomen and pelvis was performed with the administration of intravenous contrast. Multiplanar reformatted images are provided for review. Dose modulation, iterative reconstruction, and/or weight based adjustment of the mA/kV was utilized to reduce the radiation dose to as low as reasonably achievable. COMPARISON: None HISTORY: ORDERING SYSTEM PROVIDED HISTORY:  History of Crohn's, draining fistula near anus.  TECHNOLOGIST PROVIDED HISTORY: History of Crohn's, draining fistula near anus. Decision Support Exception - unselect if not a suspected or confirmed emergency medical condition->Emergency Medical Condition (MA) Reason for Exam: Abd pain, h/o Crohn's, colitis. Drainage near anal fistula. States puss/blood starting 3 days ago after starting new HIV medication. FINDINGS: Lower Chest: No active disease. Organs: The liver, gallbladder, pancreas and spleen, adrenals, kidneys, aorta and IVC appear stable. GI/Bowel: No evidence of bowel obstruction or perforation. No evidence of acute appendicitis. Small bowel appears unremarkable. No evidence of small bowel thickening or other signs of acute Crohn's disease. No evidence of diverticular disease. Pelvis: Urinary bladder, prostate and seminal vesicles appear normal. Peritoneum/Retroperitoneum: No evidence of lymphadenopathy or acute mesenteric findings. Bones/Soft Tissues: No active disease. No acute abnormalities in the abdomen or pelvis. Normal appendix. No evidence of small or large bowel abnormality. LABS:  Labs Reviewed   CBC WITH AUTO DIFFERENTIAL - Abnormal; Notable for the following components:       Result Value    RDW 10.9 (*)     Seg Neutrophils 68 (*)     Lymphocytes 24 (*)     All other components within normal limits   BASIC METABOLIC PANEL - Abnormal; Notable for the following components:    Glucose 109 (*)     Bun/Cre Ratio 8 (*)     All other components within normal limits       All other labs were within normal range or not returned as of this dictation.     EMERGENCY DEPARTMENT COURSE and DIFFERENTIAL DIAGNOSIS/MDM:   Vitals:    Vitals:    04/21/22 1554   BP: (!) 117/57   Pulse: 80   Resp: 17   Temp: 98.3 °F (36.8 °C)   TempSrc: Oral   SpO2: 97%       Orders Placed This Encounter   Medications    0.9 % sodium chloride bolus    iopamidol (ISOVUE-370) 76 % injection 75 mL    sodium chloride flush 0.9 % injection 10 mL    amoxicillin-clavulanate (AUGMENTIN) 875-125 MG per tablet     Sig: Take 1 tablet by mouth 2 times daily     Dispense:  20 tablet     Refill:  0       Medical Decision Making: CAT scan shows no abscess. He will be placed on Augmentin and will follow up with his colorectal surgeon. He was offered treatment for exposure to possible gonorrhea and chlamydia but declines. Treatment diagnosis and follow-up were discussed with the patient. CONSULTS:  None    PROCEDURES:  None    FINAL IMPRESSION      1. Buttock pain          DISPOSITION/PLAN   DISPOSITION Decision To Discharge 04/21/2022 06:52:54 PM      PATIENT REFERRED TO:   [facility] ED  [address] [facility]  [phone]    If symptoms worsen    Kadi Almaguer MD  [phone] Hien Hill MD  [address] [place] [address]  [phone]            DISCHARGE MEDICATIONS:     New Prescriptions    AMOXICILLIN-CLAVULANATE (AUGMENTIN) 875-125 MG PER TABLET    Take 1 tablet by mouth 2 times daily       The care is provided during an unprecedented national emergency due to the novel coronavirus, COVID-19.     (Please note that portions of this note were completed with a voice recognition program.  Efforts were made to edit the dictations but occasionally words are mis-transcribed.)    Lety Archuleta MD  Attending Emergency Physician           Lety Archuleta MD  04/21/22 6337

## 2022-05-18 ENCOUNTER — TELEPHONE (OUTPATIENT)
Dept: GASTROENTEROLOGY | Age: 22
End: 2022-05-18

## 2022-05-18 RX ORDER — SODIUM CHLORIDE 9 MG/ML
5-250 INJECTION, SOLUTION INTRAVENOUS PRN
Status: CANCELLED | OUTPATIENT
Start: 2022-05-18

## 2022-05-18 RX ORDER — DIPHENHYDRAMINE HCL 25 MG
25 TABLET ORAL ONCE
Status: CANCELLED | OUTPATIENT
Start: 2022-05-18

## 2022-05-18 RX ORDER — DIPHENHYDRAMINE HYDROCHLORIDE 50 MG/ML
50 INJECTION INTRAMUSCULAR; INTRAVENOUS
Status: CANCELLED | OUTPATIENT
Start: 2022-05-18

## 2022-05-18 RX ORDER — ACETAMINOPHEN 325 MG/1
650 TABLET ORAL ONCE
Status: CANCELLED | OUTPATIENT
Start: 2022-05-18

## 2022-05-18 RX ORDER — SODIUM CHLORIDE 9 MG/ML
INJECTION, SOLUTION INTRAVENOUS CONTINUOUS
Status: CANCELLED | OUTPATIENT
Start: 2022-05-18

## 2022-05-18 RX ORDER — HEPARIN SODIUM (PORCINE) LOCK FLUSH IV SOLN 100 UNIT/ML 100 UNIT/ML
500 SOLUTION INTRAVENOUS PRN
Status: CANCELLED | OUTPATIENT
Start: 2022-05-18

## 2022-05-18 RX ORDER — ALBUTEROL SULFATE 90 UG/1
4 AEROSOL, METERED RESPIRATORY (INHALATION) PRN
Status: CANCELLED | OUTPATIENT
Start: 2022-05-18

## 2022-05-18 RX ORDER — SODIUM CHLORIDE 0.9 % (FLUSH) 0.9 %
5-40 SYRINGE (ML) INJECTION PRN
Status: CANCELLED | OUTPATIENT
Start: 2022-05-18

## 2022-05-18 RX ORDER — ACETAMINOPHEN 325 MG/1
650 TABLET ORAL
Status: CANCELLED | OUTPATIENT
Start: 2022-05-18

## 2022-05-18 RX ORDER — FAMOTIDINE 10 MG/ML
20 INJECTION, SOLUTION INTRAVENOUS
Status: CANCELLED | OUTPATIENT
Start: 2022-05-18

## 2022-05-18 RX ORDER — EPINEPHRINE 1 MG/ML
0.3 INJECTION, SOLUTION, CONCENTRATE INTRAVENOUS PRN
Status: CANCELLED | OUTPATIENT
Start: 2022-05-18

## 2022-05-18 RX ORDER — ONDANSETRON 2 MG/ML
8 INJECTION INTRAMUSCULAR; INTRAVENOUS
Status: CANCELLED | OUTPATIENT
Start: 2022-05-18

## 2022-05-18 RX ORDER — CETIRIZINE HYDROCHLORIDE 10 MG/1
10 TABLET ORAL ONCE
Status: CANCELLED | OUTPATIENT
Start: 2022-05-18

## 2022-05-20 ENCOUNTER — HOSPITAL ENCOUNTER (OUTPATIENT)
Dept: INFUSION THERAPY | Facility: MEDICAL CENTER | Age: 22
Discharge: HOME | End: 2022-05-20
Payer: MEDICARE

## 2022-05-20 VITALS — BODY MASS INDEX: 21.57 KG/M2 | WEIGHT: 150.3 LBS

## 2022-05-20 DIAGNOSIS — K52.9 IBD (INFLAMMATORY BOWEL DISEASE): Primary | ICD-10-CM

## 2022-05-20 PROCEDURE — 6370000000 HC RX 637 (ALT 250 FOR IP): Performed by: INTERNAL MEDICINE

## 2022-05-20 PROCEDURE — 96413 CHEMO IV INFUSION 1 HR: CPT

## 2022-05-20 PROCEDURE — 96415 CHEMO IV INFUSION ADDL HR: CPT

## 2022-05-20 PROCEDURE — 6360000002 HC RX W HCPCS: Performed by: INTERNAL MEDICINE

## 2022-05-20 PROCEDURE — 2580000003 HC RX 258: Performed by: INTERNAL MEDICINE

## 2022-05-20 RX ORDER — ONDANSETRON 2 MG/ML
8 INJECTION INTRAMUSCULAR; INTRAVENOUS
Status: CANCELLED | OUTPATIENT
Start: 2022-07-15

## 2022-05-20 RX ORDER — CETIRIZINE HYDROCHLORIDE 10 MG/1
10 TABLET ORAL ONCE
Status: COMPLETED | OUTPATIENT
Start: 2022-05-20 | End: 2022-05-20

## 2022-05-20 RX ORDER — SODIUM CHLORIDE 0.9 % (FLUSH) 0.9 %
5-40 SYRINGE (ML) INJECTION PRN
Status: CANCELLED | OUTPATIENT
Start: 2022-07-15

## 2022-05-20 RX ORDER — FAMOTIDINE 10 MG/ML
20 INJECTION, SOLUTION INTRAVENOUS
Status: CANCELLED | OUTPATIENT
Start: 2022-07-15

## 2022-05-20 RX ORDER — SODIUM CHLORIDE 9 MG/ML
INJECTION, SOLUTION INTRAVENOUS CONTINUOUS
Status: CANCELLED | OUTPATIENT
Start: 2022-07-15

## 2022-05-20 RX ORDER — ACETAMINOPHEN 325 MG/1
650 TABLET ORAL ONCE
Status: CANCELLED | OUTPATIENT
Start: 2022-07-15

## 2022-05-20 RX ORDER — ACETAMINOPHEN 325 MG/1
650 TABLET ORAL ONCE
Status: COMPLETED | OUTPATIENT
Start: 2022-05-20 | End: 2022-05-20

## 2022-05-20 RX ORDER — ALBUTEROL SULFATE 90 UG/1
4 AEROSOL, METERED RESPIRATORY (INHALATION) PRN
Status: CANCELLED | OUTPATIENT
Start: 2022-07-15

## 2022-05-20 RX ORDER — DIPHENHYDRAMINE HCL 25 MG
25 TABLET ORAL ONCE
Status: CANCELLED | OUTPATIENT
Start: 2022-07-15

## 2022-05-20 RX ORDER — DIPHENHYDRAMINE HCL 25 MG
25 TABLET ORAL ONCE
Status: COMPLETED | OUTPATIENT
Start: 2022-05-20 | End: 2022-05-20

## 2022-05-20 RX ORDER — SODIUM CHLORIDE 9 MG/ML
5-250 INJECTION, SOLUTION INTRAVENOUS PRN
Status: CANCELLED | OUTPATIENT
Start: 2022-07-15

## 2022-05-20 RX ORDER — CETIRIZINE HYDROCHLORIDE 10 MG/1
10 TABLET ORAL ONCE
Status: CANCELLED | OUTPATIENT
Start: 2022-07-15

## 2022-05-20 RX ORDER — HEPARIN SODIUM (PORCINE) LOCK FLUSH IV SOLN 100 UNIT/ML 100 UNIT/ML
500 SOLUTION INTRAVENOUS PRN
Status: CANCELLED | OUTPATIENT
Start: 2022-07-15

## 2022-05-20 RX ORDER — ACETAMINOPHEN 325 MG/1
650 TABLET ORAL
Status: CANCELLED | OUTPATIENT
Start: 2022-07-15

## 2022-05-20 RX ORDER — DIPHENHYDRAMINE HYDROCHLORIDE 50 MG/ML
50 INJECTION INTRAMUSCULAR; INTRAVENOUS
Status: CANCELLED | OUTPATIENT
Start: 2022-07-15

## 2022-05-20 RX ORDER — SODIUM CHLORIDE 9 MG/ML
5-250 INJECTION, SOLUTION INTRAVENOUS PRN
Status: DISCONTINUED | OUTPATIENT
Start: 2022-05-20 | End: 2022-05-21 | Stop reason: HOSPADM

## 2022-05-20 RX ADMIN — DIPHENHYDRAMINE HYDROCHLORIDE 25 MG: 25 TABLET ORAL at 09:17

## 2022-05-20 RX ADMIN — INFLIXIMAB 300 MG: 100 INJECTION, POWDER, LYOPHILIZED, FOR SOLUTION INTRAVENOUS at 09:52

## 2022-05-20 RX ADMIN — CETIRIZINE HYDROCHLORIDE 10 MG: 10 TABLET ORAL at 09:16

## 2022-05-20 RX ADMIN — ACETAMINOPHEN 650 MG: 325 TABLET ORAL at 09:16

## 2022-05-20 ASSESSMENT — PAIN SCALES - GENERAL: PAINLEVEL_OUTOF10: 0

## 2022-05-20 NOTE — PROGRESS NOTES
Pt  arrive ambulatory for remicade infusion. Denies complaints or concerns. States has no sign or symptoms of infection. Vitals as charted. Peripheral IV established to right hand per policy. Patient premedicated  Remicade infused with no adverse reactions; see MAR for titration and epic flowsheet for stable vital.  Line flushed, pt monitored  for 30 minutes post infusion with no adverse reaction noted. Intact IV catheter removed and pressure dressing applied. Patient discharge.

## 2022-07-05 ENCOUNTER — HOSPITAL ENCOUNTER (EMERGENCY)
Age: 22
Discharge: HOME | End: 2022-07-05
Attending: EMERGENCY MEDICINE
Payer: MEDICARE

## 2022-07-05 ENCOUNTER — APPOINTMENT (OUTPATIENT)
Dept: CT IMAGING | Age: 22
End: 2022-07-05
Payer: MEDICARE

## 2022-07-05 VITALS
WEIGHT: 153 LBS | DIASTOLIC BLOOD PRESSURE: 67 MMHG | TEMPERATURE: 97.9 F | SYSTOLIC BLOOD PRESSURE: 125 MMHG | RESPIRATION RATE: 16 BRPM | BODY MASS INDEX: 21.9 KG/M2 | OXYGEN SATURATION: 100 % | HEART RATE: 62 BPM | HEIGHT: 70 IN

## 2022-07-05 DIAGNOSIS — K61.1 PERIRECTAL ABSCESS: Primary | ICD-10-CM

## 2022-07-05 LAB
ABSOLUTE EOS #: 0.58 K/UL (ref 0–0.44)
ABSOLUTE IMMATURE GRANULOCYTE: 0.01 K/UL (ref 0–0.3)
ABSOLUTE LYMPH #: 2.4 K/UL (ref 1.1–3.7)
ABSOLUTE MONO #: 0.58 K/UL (ref 0.1–1.2)
ANION GAP SERPL CALCULATED.3IONS-SCNC: 8 MMOL/L (ref 9–17)
BASOPHILS # BLD: 0 % (ref 0–2)
BASOPHILS ABSOLUTE: <0.03 K/UL (ref 0–0.2)
BUN BLDV-MCNC: 10 MG/DL (ref 6–20)
BUN/CREAT BLD: 9 (ref 9–20)
CALCIUM SERPL-MCNC: 9.3 MG/DL (ref 8.6–10.4)
CHLORIDE BLD-SCNC: 102 MMOL/L (ref 98–107)
CO2: 29 MMOL/L (ref 20–31)
CREAT SERPL-MCNC: 1.1 MG/DL (ref 0.7–1.2)
EOSINOPHILS RELATIVE PERCENT: 9 % (ref 1–4)
GFR AFRICAN AMERICAN: >60 ML/MIN
GFR NON-AFRICAN AMERICAN: >60 ML/MIN
GFR SERPL CREATININE-BSD FRML MDRD: ABNORMAL ML/MIN/{1.73_M2}
GLUCOSE BLD-MCNC: 92 MG/DL (ref 70–99)
HCT VFR BLD CALC: 43.8 % (ref 40.7–50.3)
HEMOGLOBIN: 14.1 G/DL (ref 13–17)
IMMATURE GRANULOCYTES: 0 %
LYMPHOCYTES # BLD: 37 % (ref 24–43)
MCH RBC QN AUTO: 31.7 PG (ref 25.2–33.5)
MCHC RBC AUTO-ENTMCNC: 32.2 G/DL (ref 28.4–34.8)
MCV RBC AUTO: 98.4 FL (ref 82.6–102.9)
MONOCYTES # BLD: 9 % (ref 3–12)
NRBC AUTOMATED: 0 PER 100 WBC
PDW BLD-RTO: 11.1 % (ref 11.8–14.4)
PLATELET # BLD: 265 K/UL (ref 138–453)
PMV BLD AUTO: 9.5 FL (ref 8.1–13.5)
POTASSIUM SERPL-SCNC: 4.1 MMOL/L (ref 3.7–5.3)
RBC # BLD: 4.45 M/UL (ref 4.21–5.77)
SEG NEUTROPHILS: 45 % (ref 36–65)
SEGMENTED NEUTROPHILS ABSOLUTE COUNT: 2.89 K/UL (ref 1.5–8.1)
SODIUM BLD-SCNC: 139 MMOL/L (ref 135–144)
WBC # BLD: 6.5 K/UL (ref 3.5–11.3)

## 2022-07-05 PROCEDURE — 85025 COMPLETE CBC W/AUTO DIFF WBC: CPT

## 2022-07-05 PROCEDURE — 80048 BASIC METABOLIC PNL TOTAL CA: CPT

## 2022-07-05 PROCEDURE — 74177 CT ABD & PELVIS W/CONTRAST: CPT

## 2022-07-05 PROCEDURE — 2580000003 HC RX 258: Performed by: EMERGENCY MEDICINE

## 2022-07-05 PROCEDURE — 99285 EMERGENCY DEPT VISIT HI MDM: CPT

## 2022-07-05 PROCEDURE — 6360000004 HC RX CONTRAST MEDICATION: Performed by: EMERGENCY MEDICINE

## 2022-07-05 RX ORDER — 0.9 % SODIUM CHLORIDE 0.9 %
80 INTRAVENOUS SOLUTION INTRAVENOUS ONCE
Status: DISCONTINUED | OUTPATIENT
Start: 2022-07-05 | End: 2022-07-05 | Stop reason: HOSPADM

## 2022-07-05 RX ORDER — SODIUM CHLORIDE 0.9 % (FLUSH) 0.9 %
10 SYRINGE (ML) INJECTION PRN
Status: DISCONTINUED | OUTPATIENT
Start: 2022-07-05 | End: 2022-07-05 | Stop reason: HOSPADM

## 2022-07-05 RX ORDER — AMOXICILLIN AND CLAVULANATE POTASSIUM 875; 125 MG/1; MG/1
1 TABLET, FILM COATED ORAL 2 TIMES DAILY
Qty: 14 TABLET | Refills: 0 | Status: SHIPPED | OUTPATIENT
Start: 2022-07-05 | End: 2022-07-12

## 2022-07-05 RX ADMIN — Medication 80 ML: at 17:28

## 2022-07-05 RX ADMIN — IOPAMIDOL 75 ML: 755 INJECTION, SOLUTION INTRAVENOUS at 17:28

## 2022-07-05 RX ADMIN — SODIUM CHLORIDE, PRESERVATIVE FREE 10 ML: 5 INJECTION INTRAVENOUS at 17:28

## 2022-07-05 ASSESSMENT — PAIN - FUNCTIONAL ASSESSMENT: PAIN_FUNCTIONAL_ASSESSMENT: 0-10

## 2022-07-05 ASSESSMENT — ENCOUNTER SYMPTOMS
APNEA: 0
SHORTNESS OF BREATH: 0
COLOR CHANGE: 0
DIARRHEA: 0
CHEST TIGHTNESS: 0
EYES NEGATIVE: 1
VOMITING: 0
ABDOMINAL PAIN: 1
ABDOMINAL DISTENTION: 0
CONSTIPATION: 0
SINUS PAIN: 0

## 2022-07-05 ASSESSMENT — PAIN SCALES - GENERAL: PAINLEVEL_OUTOF10: 6

## 2022-07-05 NOTE — ED NOTES
Pt called from triage area to go to Rm 27. Pt observed eating popcorn in waiting room.       Latricia Burgess RN  07/05/22 4864

## 2022-07-05 NOTE — ED PROVIDER NOTES
EMERGENCY DEPARTMENT ENCOUNTER    Pt Name: Azalia Navarro  MRN: [id number]  [place] 2000  Date of evaluation: 7/5/22  CHIEF COMPLAINT       Chief Complaint   Patient presents with    Abdominal Pain     Pt has hx of Crohn's; concerned for possible flare up; also has peritoneal fistula that he would like checked    Other     Pt reports hx of SI with plans but does not currently have one; would like to see social work if possible     HISTORY OF PRESENT ILLNESS   80-year-old male presents emergency room for generalized abdominal discomfort. Patient has history of Crohn's disease. He follows with Dr. 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 Patient is on Remicade. He has intermittent issues with abdominal discomfort. Patient also suffers from undiagnosed depression. He states his disease process does not help with this. He does have suicidal thoughts on occasion but reports this has been going on for a number of months. Patient states that his symptoms wax and wane they are not more severe now than they have been before. He does not actively have a plan. He does not see a psychiatrist and has never mentioned to his primary care his symptoms. REVIEW OF SYSTEMS     Review of Systems   Constitutional: Negative for activity change, chills and diaphoresis. HENT: Negative for congestion, sinus pain and tinnitus. Eyes: Negative. Respiratory: Negative for apnea, chest tightness and shortness of breath. Gastrointestinal: Positive for abdominal pain. Negative for abdominal distention, constipation, diarrhea and vomiting. Genitourinary: Negative for difficulty urinating and frequency. Musculoskeletal: Negative for arthralgias and myalgias. Skin: Negative for color change and rash. Neurological: Negative for dizziness. Hematological: Negative. Psychiatric/Behavioral: Positive for dysphoric mood and suicidal ideas.        PASTMEDICAL HISTORY     Past Medical History:   Diagnosis Date    Anal fissure     Colitis     [address]'s [facility] [address] [address]     Meagan-rectal abscess     Rectal pain      Past Problem List  Patient Active Problem List   Diagnosis Code    IBD (inflammatory bowel disease) K52.9     SURGICAL HISTORY       Past Surgical History:   Procedure Laterality Date    COLONOSCOPY  2021    COLONOSCOPY N/A 2021    COLONOSCOPY WITH BIOPSY performed by Tunde Spivey MD at [address]  2020    rectal abcess excision, Dr. Brandon Lubin N/A 2021    Phyllis Flick performed by Tunde Spivey MD at [facility]  2006     CURRENT MEDICATIONS       Discharge Medication List as of 2022  8:56 PM      CONTINUE these medications which have NOT CHANGED    Details   emtricitabine-tenofovir (TRUVADA) 200-300 MG per tablet Take 1 tablet by mouth daily, Disp-28 tablet, R-0Print      dolutegravir sodium (TIVICAY) 50 MG tablet Take 1 tablet by mouth daily, Disp-28 tablet, R-0Print      hyoscyamine (ANASPAZ;LEVSIN) 125 MCG tablet Take 0.125 mg by mouth every 6 hours as neededHistorical Med      inFLIXimab (REMICADE) 100 MG injection Infuse 5 mg/kg intravenously See Admin Instructions Every other monthHistorical Med      acetaminophen (TYLENOL) 500 MG tablet Take 500 mg by mouth every 6 hours as needed for PainHistorical Med           ALLERGIES     has No Known Allergies. FAMILY HISTORY     He indicated that his mother is alive. He indicated that his father is alive.      SOCIAL HISTORY       Social History     Tobacco Use    Smoking status: Former Smoker     Quit date: 10/6/2021     Years since quittin.7    Smokeless tobacco: Never Used    Tobacco comment: 1-2 cigarettes per month   Vaping Use    Vaping Use: Every day    Substances: Nicotine, Flavoring    Devices: Disposable   Substance Use Topics    Alcohol use: Yes     Comment: occasional    Drug use: Never PHYSICAL EXAM     INITIAL VITALS: /67   Pulse 62   Temp 97.9 °F (36.6 °C) (Oral)   Resp 16   Ht 5' 10\" (1.778 m)   Wt 153 lb (69.4 kg)   SpO2 100%   BMI 21.95 kg/m²    Physical Exam  Constitutional:       General: He is not in acute distress. Appearance: He is well-developed. HENT:      Head: Normocephalic. Eyes:      Pupils: Pupils are equal, round, and reactive to light. Cardiovascular:      Rate and Rhythm: Normal rate and regular rhythm. Heart sounds: Normal heart sounds. Pulmonary:      Effort: Pulmonary effort is normal. No respiratory distress. Breath sounds: Normal breath sounds. Abdominal:      General: Bowel sounds are normal.      Palpations: Abdomen is soft. Tenderness: There is no abdominal tenderness. Genitourinary:      Musculoskeletal:         General: Normal range of motion. Skin:     General: Skin is warm and dry. Neurological:      Mental Status: He is alert and oriented to person, place, and time. MEDICAL DECISION MAKIN-year-old male presenting to the emergency room with rectal discomfort and continued abdominal issues related to Crohn's disease. Patient has normal white count here in the emergency room. He does not have any new inflammatory changes on CT imaging but does have perianal fistula with abscess formation. Case was discussed with patient's colorectal surgeon Dr. Efe Royal. Plan is to start him on oral antibiotics with follow-up in the office later this week. CRITICAL CARE:       PROCEDURES:    Procedures    DIAGNOSTIC RESULTS   EKG:All EKG's are interpreted by the Emergency Department Physician who either signs or Co-signs this chart in the absence of a cardiologist.        RADIOLOGY:All plain film, CT, MRI, and formal ultrasound images (except ED bedside ultrasound) are read by the radiologist, see reports below, unless otherwisenoted in MDM or here.   CT ABDOMEN PELVIS W IV CONTRAST Additional Contrast? None Final Result   Appendix is normal.  No obstructive uropathy. No discrete inflamed and   hyperenhancing or thickened bowel loop is noted. Fistulous tract within the left side of perineum adjacent to the left anal   canal containing fluid collection measuring 1.9 x 1.6 x 2.1 cm. Findings are   concerning for perianal fistula with associated abscess. The abscess   collection is larger since prior examination. LABS: All lab results were reviewed by myself, and all abnormals are listed below. Labs Reviewed   CBC WITH AUTO DIFFERENTIAL - Abnormal; Notable for the following components:       Result Value    RDW 11.1 (*)     Eosinophils % 9 (*)     Absolute Eos # 0.58 (*)     All other components within normal limits   BASIC METABOLIC PANEL W/ REFLEX TO MG FOR LOW K - Abnormal; Notable for the following components:    Anion Gap 8 (*)     All other components within normal limits       EMERGENCY DEPARTMENTCOURSE:         Vitals:    Vitals:    07/05/22 1635   BP: 125/67   Pulse: 62   Resp: 16   Temp: 97.9 °F (36.6 °C)   TempSrc: Oral   SpO2: 100%   Weight: 153 lb (69.4 kg)   Height: 5' 10\" (1.778 m)       The patient was given the following medications while in the emergency department:  Orders Placed This Encounter   Medications    0.9 % sodium chloride bolus    iopamidol (ISOVUE-370) 76 % injection 75 mL    sodium chloride flush 0.9 % injection 10 mL    amoxicillin-clavulanate (AUGMENTIN) 875-125 MG per tablet     Sig: Take 1 tablet by mouth 2 times daily for 7 days     Dispense:  14 tablet     Refill:  0     CONSULTS:  IP CONSULT TO COLORECTAL SURGERY    FINAL IMPRESSION      1.  Perirectal abscess          DISPOSITION/PLAN   DISPOSITION Decision To Discharge 07/05/2022 08:54:08 PM      PATIENT REFERRED TO:  Camilla Arcos MD  [address]  [phone]    Schedule an appointment as soon as possible for a visit in 1 week      DISCHARGE MEDICATIONS:  Discharge Medication List as of 7/5/2022  8:56 PM        Julio Guzman MD  Attending Emergency Physician      Care during this encounter was due to an unprecedented national emergency due to COVID-19.            Dm Duong MD  07/05/22 4341

## 2022-07-06 NOTE — ED NOTES
called patient to follow up per request of Dr Rosy Brown.  provided information on [address] line including phone number to call should patient have any suicidal thoughts.  also discussed services available at [facility] mental health agencies and provided resources for [address] [place] [address] as patient identified as a member of the LGBT community.

## 2022-07-12 DIAGNOSIS — K61.0 PERIANAL ABSCESS: ICD-10-CM

## 2022-07-12 DIAGNOSIS — K52.9 COLITIS: ICD-10-CM

## 2022-07-15 ENCOUNTER — HOSPITAL ENCOUNTER (OUTPATIENT)
Dept: INFUSION THERAPY | Age: 22
Discharge: HOME | End: 2022-07-15
Payer: MEDICARE

## 2022-07-15 VITALS
DIASTOLIC BLOOD PRESSURE: 77 MMHG | SYSTOLIC BLOOD PRESSURE: 116 MMHG | HEART RATE: 70 BPM | RESPIRATION RATE: 16 BRPM | TEMPERATURE: 97.6 F

## 2022-07-15 DIAGNOSIS — K52.9 IBD (INFLAMMATORY BOWEL DISEASE): Primary | ICD-10-CM

## 2022-07-15 PROCEDURE — 96366 THER/PROPH/DIAG IV INF ADDON: CPT

## 2022-07-15 PROCEDURE — 96415 CHEMO IV INFUSION ADDL HR: CPT

## 2022-07-15 PROCEDURE — 96365 THER/PROPH/DIAG IV INF INIT: CPT

## 2022-07-15 PROCEDURE — 2580000003 HC RX 258: Performed by: INTERNAL MEDICINE

## 2022-07-15 PROCEDURE — 96413 CHEMO IV INFUSION 1 HR: CPT

## 2022-07-15 PROCEDURE — 6370000000 HC RX 637 (ALT 250 FOR IP): Performed by: INTERNAL MEDICINE

## 2022-07-15 PROCEDURE — 6360000002 HC RX W HCPCS: Performed by: INTERNAL MEDICINE

## 2022-07-15 RX ORDER — ONDANSETRON 2 MG/ML
8 INJECTION INTRAMUSCULAR; INTRAVENOUS
Status: CANCELLED | OUTPATIENT
Start: 2022-09-09

## 2022-07-15 RX ORDER — SODIUM CHLORIDE 9 MG/ML
INJECTION, SOLUTION INTRAVENOUS CONTINUOUS
Status: CANCELLED | OUTPATIENT
Start: 2022-09-09

## 2022-07-15 RX ORDER — CETIRIZINE HYDROCHLORIDE 10 MG/1
10 TABLET ORAL ONCE
Status: CANCELLED | OUTPATIENT
Start: 2022-09-09

## 2022-07-15 RX ORDER — SODIUM CHLORIDE 9 MG/ML
5-250 INJECTION, SOLUTION INTRAVENOUS PRN
Status: CANCELLED | OUTPATIENT
Start: 2022-09-09

## 2022-07-15 RX ORDER — ACETAMINOPHEN 325 MG/1
650 TABLET ORAL ONCE
Status: COMPLETED | OUTPATIENT
Start: 2022-07-15 | End: 2022-07-15

## 2022-07-15 RX ORDER — CETIRIZINE HYDROCHLORIDE 10 MG/1
10 TABLET ORAL ONCE
Status: COMPLETED | OUTPATIENT
Start: 2022-07-15 | End: 2022-07-15

## 2022-07-15 RX ORDER — HEPARIN SODIUM (PORCINE) LOCK FLUSH IV SOLN 100 UNIT/ML 100 UNIT/ML
500 SOLUTION INTRAVENOUS PRN
Status: CANCELLED | OUTPATIENT
Start: 2022-09-09

## 2022-07-15 RX ORDER — SODIUM CHLORIDE 0.9 % (FLUSH) 0.9 %
5-40 SYRINGE (ML) INJECTION PRN
Status: CANCELLED | OUTPATIENT
Start: 2022-09-09

## 2022-07-15 RX ORDER — FAMOTIDINE 10 MG/ML
20 INJECTION, SOLUTION INTRAVENOUS
Status: CANCELLED | OUTPATIENT
Start: 2022-09-09

## 2022-07-15 RX ORDER — DIPHENHYDRAMINE HCL 25 MG
25 TABLET ORAL ONCE
Status: CANCELLED | OUTPATIENT
Start: 2022-09-09

## 2022-07-15 RX ORDER — SODIUM CHLORIDE 9 MG/ML
5-250 INJECTION, SOLUTION INTRAVENOUS PRN
Status: DISCONTINUED | OUTPATIENT
Start: 2022-07-15 | End: 2022-07-16 | Stop reason: HOSPADM

## 2022-07-15 RX ORDER — ALBUTEROL SULFATE 90 UG/1
4 AEROSOL, METERED RESPIRATORY (INHALATION) PRN
Status: CANCELLED | OUTPATIENT
Start: 2022-09-09

## 2022-07-15 RX ORDER — ACETAMINOPHEN 325 MG/1
650 TABLET ORAL
Status: CANCELLED | OUTPATIENT
Start: 2022-09-09

## 2022-07-15 RX ORDER — ACETAMINOPHEN 325 MG/1
650 TABLET ORAL ONCE
Status: CANCELLED | OUTPATIENT
Start: 2022-09-09

## 2022-07-15 RX ORDER — EPINEPHRINE 1 MG/ML
0.3 INJECTION, SOLUTION, CONCENTRATE INTRAVENOUS PRN
Status: CANCELLED | OUTPATIENT
Start: 2022-09-09

## 2022-07-15 RX ORDER — DIPHENHYDRAMINE HYDROCHLORIDE 50 MG/ML
50 INJECTION INTRAMUSCULAR; INTRAVENOUS
Status: CANCELLED | OUTPATIENT
Start: 2022-09-09

## 2022-07-15 RX ORDER — DIPHENHYDRAMINE HCL 25 MG
25 TABLET ORAL ONCE
Status: COMPLETED | OUTPATIENT
Start: 2022-07-15 | End: 2022-07-15

## 2022-07-15 RX ADMIN — CETIRIZINE HYDROCHLORIDE 10 MG: 10 TABLET, FILM COATED ORAL at 13:30

## 2022-07-15 RX ADMIN — ACETAMINOPHEN 650 MG: 325 TABLET ORAL at 13:30

## 2022-07-15 RX ADMIN — DIPHENHYDRAMINE HYDROCHLORIDE 25 MG: 25 TABLET ORAL at 13:30

## 2022-07-15 RX ADMIN — INFLIXIMAB 300 MG: 100 INJECTION, POWDER, LYOPHILIZED, FOR SOLUTION INTRAVENOUS at 13:37

## 2022-07-15 RX ADMIN — SODIUM CHLORIDE 50 ML/HR: 9 INJECTION, SOLUTION INTRAVENOUS at 13:22

## 2022-07-18 ENCOUNTER — HOSPITAL ENCOUNTER (OUTPATIENT)
Dept: INFUSION THERAPY | Facility: MEDICAL CENTER | Age: 22
End: 2022-07-18

## 2022-08-10 ENCOUNTER — HOSPITAL ENCOUNTER (EMERGENCY)
Age: 22
Discharge: HOME | End: 2022-08-10
Attending: EMERGENCY MEDICINE
Payer: MEDICARE

## 2022-08-10 VITALS
BODY MASS INDEX: 22.33 KG/M2 | DIASTOLIC BLOOD PRESSURE: 53 MMHG | HEIGHT: 70 IN | HEART RATE: 63 BPM | RESPIRATION RATE: 16 BRPM | TEMPERATURE: 98.3 F | WEIGHT: 156 LBS | OXYGEN SATURATION: 98 % | SYSTOLIC BLOOD PRESSURE: 109 MMHG

## 2022-08-10 DIAGNOSIS — K62.89 IRRITATION OF SKIN OF PERIANAL REGION: Primary | ICD-10-CM

## 2022-08-10 PROCEDURE — 99283 EMERGENCY DEPT VISIT LOW MDM: CPT

## 2022-08-10 RX ORDER — LIDOCAINE 50 MG/G
OINTMENT TOPICAL 2 TIMES DAILY
Qty: 10 G | Refills: 0 | Status: SHIPPED | OUTPATIENT
Start: 2022-08-10

## 2022-08-10 ASSESSMENT — PAIN - FUNCTIONAL ASSESSMENT: PAIN_FUNCTIONAL_ASSESSMENT: 0-10

## 2022-08-10 ASSESSMENT — PAIN SCALES - GENERAL: PAINLEVEL_OUTOF10: 7

## 2022-08-10 NOTE — ED PROVIDER NOTES
EMERGENCY DEPARTMENT ENCOUNTER    Pt Name: Deliliah Severe  MRN: [id number]  [place] 2000  Date of evaluation: 8/10/22  CHIEF COMPLAINT       Chief Complaint   Patient presents with    Abscess     Pt reports he had a perineal abscess that was recently drained in July; pt concerned for possible infection/swelling; was advised to come to ED for evaluation     HISTORY OF PRESENT ILLNESS   Patient is a 24-year-old male who presents to the ED for follow-up status post perirectal abscess that was drained by Dr. Efe Royal last month. He still feels raised area of skin that has been more irritated over the past few days. No other issues at this time. REVIEW OF SYSTEMS     Review of Systems   All other systems reviewed and are negative. PASTMEDICAL HISTORY     Past Medical History:   Diagnosis Date    Anal fissure     Colitis     Crohn's colitis ([facility] [address]     Meagan-rectal abscess     Rectal pain      SURGICAL HISTORY       Past Surgical History:   Procedure Laterality Date    COLONOSCOPY  03/08/2021    COLONOSCOPY N/A 4/1/2021    COLONOSCOPY WITH BIOPSY performed by Peewee Eldridge MD at [place] [address]  12/22/2020    rectal abcess excision, Dr. Isael hSah 8/5/2021    Cleave Su performed by Peewee Eldridge MD at [address]  2006     CURRENT MEDICATIONS       Previous Medications    ACETAMINOPHEN (TYLENOL) 500 MG TABLET    Take 500 mg by mouth every 6 hours as needed for Pain    DOLUTEGRAVIR SODIUM (TIVICAY) 50 MG TABLET    Take 1 tablet by mouth daily    EMTRICITABINE-TENOFOVIR (TRUVADA) 200-300 MG PER TABLET    Take 1 tablet by mouth daily    HYOSCYAMINE (ANASPAZ;LEVSIN) 125 MCG TABLET    Take 0.125 mg by mouth every 6 hours as needed    INFLIXIMAB (REMICADE) 100 MG INJECTION    Infuse 5 mg/kg intravenously See Admin Instructions Every other month     ALLERGIES     has No Known Allergies. FAMILY HISTORY     He indicated that his mother is alive. He indicated that his father is alive. SOCIAL HISTORY       Social History     Tobacco Use    Smoking status: Former     Types: Cigarettes     Quit date: 10/6/2021     Years since quittin.8    Smokeless tobacco: Never    Tobacco comments:     1-2 cigarettes per month   Vaping Use    Vaping Use: Every day    Substances: Nicotine, Flavoring    Devices: Disposable   Substance Use Topics    Alcohol use: Yes     Comment: occasional    Drug use: Never     PHYSICAL EXAM     INITIAL VITALS: BP (!) 109/53   Pulse 63   Temp 98.3 °F (36.8 °C) (Oral)   Resp 16   Ht 5' 10\" (1.778 m)   Wt 156 lb (70.8 kg)   SpO2 98%   BMI 22.38 kg/m²    Physical Exam  Exam conducted with a chaperone present. Genitourinary:         Comments: Small area of raised irritated skin at incision site of previous abscess drainage. No erythema, no purulent discharge. No fluctuance appreciated. Sussy Martinez MD MAKING:   The patient is hemodynamically stable, afebrile, nontoxic-appearing. Physical exam notable for irritated skin above incision site from previous abscess drainage. Based on history and exam symptoms likely from the ananya, chafing of previous incision site. ED plan for no I&D indicated. Refill lidocaine ointment prescription. Give patient supplies for covering area to provide barrier protection. He has follow-up with Dr. Timothy Adan next week. DIAGNOSTIC RESULTS   EKG:All EKG's are interpreted by the Emergency Department Physician who either signs or Co-signs this chart in the absence of a cardiologist.        RADIOLOGY:All plain film, CT, MRI, and formal ultrasound images (except ED bedside ultrasound) are read by the radiologist, see reports below, unless otherwisenoted in MDM or here. No orders to display     LABS: All lab results were reviewed by myself, and all abnormals are listed below.   Labs Reviewed - No data to display    EMERGENCY DEPARTMENTCOURSE:   No further work-up indicated at this time.    Nursing notes reviewed. At this time this is what I find, the patient appears well and does not appear sick or toxic. I gave my usual and customary discussion of the risks and benefits of discharge versus admission. I answered the patient's questions. I gave the patient strict return precautions. Patient expressed understanding of the discharge instructions. The care is provided during an unprecedented national emergency due to the novel coronavirus, COVID-19. Vitals:    Vitals:    08/10/22 1525   BP: (!) 109/53   Pulse: 63   Resp: 16   Temp: 98.3 °F (36.8 °C)   TempSrc: Oral   SpO2: 98%   Weight: 156 lb (70.8 kg)   Height: 5' 10\" (1.778 m)       The patient was given the following medications while in the emergency department:  Orders Placed This Encounter   Medications    lidocaine (XYLOCAINE) 5 % ointment     Sig: Apply topically 2 times daily Apply topically as needed. Dispense:  10 g     Refill:  0     CONSULTS:  None    FINAL IMPRESSION      1. Irritation of skin of perianal region          DISPOSITION/PLAN   DISPOSITION Decision To Discharge 08/10/2022 04:00:24 PM      PATIENT REFERRED TO:  Kenyetta Ramirez MD  [address] [place], [place] [address] [facility]  [phone]    In 2 days    DISCHARGE MEDICATIONS:  New Prescriptions    LIDOCAINE (XYLOCAINE) 5 % OINTMENT    Apply topically 2 times daily Apply topically as needed.      Luis Antonio Bee MD  Attending Emergency Physician                    Lin Adams MD  08/10/22 1311

## 2022-09-12 ENCOUNTER — HOSPITAL ENCOUNTER (OUTPATIENT)
Dept: INFUSION THERAPY | Age: 22
Discharge: HOME | End: 2022-09-12
Payer: MEDICARE

## 2022-09-12 VITALS
HEART RATE: 70 BPM | RESPIRATION RATE: 16 BRPM | TEMPERATURE: 98.4 F | SYSTOLIC BLOOD PRESSURE: 110 MMHG | DIASTOLIC BLOOD PRESSURE: 67 MMHG

## 2022-09-12 DIAGNOSIS — K52.9 IBD (INFLAMMATORY BOWEL DISEASE): Primary | ICD-10-CM

## 2022-09-12 PROCEDURE — 6360000002 HC RX W HCPCS: Performed by: INTERNAL MEDICINE

## 2022-09-12 PROCEDURE — 96415 CHEMO IV INFUSION ADDL HR: CPT

## 2022-09-12 PROCEDURE — 2580000003 HC RX 258: Performed by: INTERNAL MEDICINE

## 2022-09-12 PROCEDURE — 96413 CHEMO IV INFUSION 1 HR: CPT

## 2022-09-12 PROCEDURE — 6370000000 HC RX 637 (ALT 250 FOR IP): Performed by: INTERNAL MEDICINE

## 2022-09-12 RX ORDER — DIPHENHYDRAMINE HCL 25 MG
25 TABLET ORAL ONCE
Status: COMPLETED | OUTPATIENT
Start: 2022-09-12 | End: 2022-09-12

## 2022-09-12 RX ORDER — SODIUM CHLORIDE 9 MG/ML
INJECTION, SOLUTION INTRAVENOUS CONTINUOUS
OUTPATIENT
Start: 2022-10-31

## 2022-09-12 RX ORDER — DIPHENHYDRAMINE HYDROCHLORIDE 50 MG/ML
50 INJECTION INTRAMUSCULAR; INTRAVENOUS
OUTPATIENT
Start: 2022-10-31

## 2022-09-12 RX ORDER — CETIRIZINE HYDROCHLORIDE 10 MG/1
10 TABLET ORAL ONCE
Status: COMPLETED | OUTPATIENT
Start: 2022-09-12 | End: 2022-09-12

## 2022-09-12 RX ORDER — ACETAMINOPHEN 325 MG/1
650 TABLET ORAL ONCE
OUTPATIENT
Start: 2022-10-31

## 2022-09-12 RX ORDER — SODIUM CHLORIDE 9 MG/ML
5-250 INJECTION, SOLUTION INTRAVENOUS PRN
OUTPATIENT
Start: 2022-10-31

## 2022-09-12 RX ORDER — ACETAMINOPHEN 325 MG/1
650 TABLET ORAL ONCE
Status: COMPLETED | OUTPATIENT
Start: 2022-09-12 | End: 2022-09-12

## 2022-09-12 RX ORDER — ONDANSETRON 2 MG/ML
8 INJECTION INTRAMUSCULAR; INTRAVENOUS
OUTPATIENT
Start: 2022-10-31

## 2022-09-12 RX ORDER — ACETAMINOPHEN 325 MG/1
650 TABLET ORAL
OUTPATIENT
Start: 2022-10-31

## 2022-09-12 RX ORDER — ALBUTEROL SULFATE 90 UG/1
4 AEROSOL, METERED RESPIRATORY (INHALATION) PRN
OUTPATIENT
Start: 2022-10-31

## 2022-09-12 RX ORDER — HEPARIN SODIUM (PORCINE) LOCK FLUSH IV SOLN 100 UNIT/ML 100 UNIT/ML
500 SOLUTION INTRAVENOUS PRN
OUTPATIENT
Start: 2022-10-31

## 2022-09-12 RX ORDER — SODIUM CHLORIDE 0.9 % (FLUSH) 0.9 %
5-40 SYRINGE (ML) INJECTION PRN
OUTPATIENT
Start: 2022-10-31

## 2022-09-12 RX ORDER — CETIRIZINE HYDROCHLORIDE 10 MG/1
10 TABLET ORAL ONCE
OUTPATIENT
Start: 2022-10-31

## 2022-09-12 RX ORDER — DIPHENHYDRAMINE HCL 25 MG
25 TABLET ORAL ONCE
OUTPATIENT
Start: 2022-10-31

## 2022-09-12 RX ORDER — SODIUM CHLORIDE 9 MG/ML
5-250 INJECTION, SOLUTION INTRAVENOUS PRN
Status: DISCONTINUED | OUTPATIENT
Start: 2022-09-12 | End: 2022-09-13 | Stop reason: HOSPADM

## 2022-09-12 RX ORDER — FAMOTIDINE 10 MG/ML
20 INJECTION, SOLUTION INTRAVENOUS
OUTPATIENT
Start: 2022-10-31

## 2022-09-12 RX ORDER — EPINEPHRINE 1 MG/ML
0.3 INJECTION, SOLUTION, CONCENTRATE INTRAVENOUS PRN
OUTPATIENT
Start: 2022-10-31

## 2022-09-12 RX ADMIN — ACETAMINOPHEN 650 MG: 325 TABLET ORAL at 13:24

## 2022-09-12 RX ADMIN — INFLIXIMAB 300 MG: 100 INJECTION, POWDER, LYOPHILIZED, FOR SOLUTION INTRAVENOUS at 13:48

## 2022-09-12 RX ADMIN — DIPHENHYDRAMINE HYDROCHLORIDE 25 MG: 25 TABLET ORAL at 13:24

## 2022-09-12 RX ADMIN — CETIRIZINE HYDROCHLORIDE 10 MG: 10 TABLET, FILM COATED ORAL at 13:24

## 2022-09-12 RX ADMIN — SODIUM CHLORIDE 150 ML/HR: 9 INJECTION, SOLUTION INTRAVENOUS at 13:30

## 2022-10-10 ENCOUNTER — ANESTHESIA EVENT (OUTPATIENT)
Dept: OPERATING ROOM | Age: 22
End: 2022-10-10
Payer: MEDICARE

## 2022-10-11 ENCOUNTER — HOSPITAL ENCOUNTER (OUTPATIENT)
Age: 22
Setting detail: OUTPATIENT SURGERY
Discharge: HOME | End: 2022-10-11
Attending: COLON & RECTAL SURGERY | Admitting: COLON & RECTAL SURGERY
Payer: MEDICARE

## 2022-10-11 ENCOUNTER — ANESTHESIA (OUTPATIENT)
Dept: OPERATING ROOM | Age: 22
End: 2022-10-11
Payer: MEDICARE

## 2022-10-11 VITALS
SYSTOLIC BLOOD PRESSURE: 135 MMHG | WEIGHT: 152 LBS | DIASTOLIC BLOOD PRESSURE: 78 MMHG | TEMPERATURE: 97 F | RESPIRATION RATE: 14 BRPM | OXYGEN SATURATION: 99 % | HEIGHT: 70 IN | BODY MASS INDEX: 21.76 KG/M2 | HEART RATE: 87 BPM

## 2022-10-11 DIAGNOSIS — Z87.19 S/P ANAL FISSURECTOMY: Primary | ICD-10-CM

## 2022-10-11 DIAGNOSIS — Z98.890 S/P ANAL FISSURECTOMY: Primary | ICD-10-CM

## 2022-10-11 PROCEDURE — 7100000001 HC PACU RECOVERY - ADDTL 15 MIN: Performed by: COLON & RECTAL SURGERY

## 2022-10-11 PROCEDURE — 7100000011 HC PHASE II RECOVERY - ADDTL 15 MIN: Performed by: COLON & RECTAL SURGERY

## 2022-10-11 PROCEDURE — 3700000000 HC ANESTHESIA ATTENDED CARE: Performed by: COLON & RECTAL SURGERY

## 2022-10-11 PROCEDURE — 2580000003 HC RX 258: Performed by: STUDENT IN AN ORGANIZED HEALTH CARE EDUCATION/TRAINING PROGRAM

## 2022-10-11 PROCEDURE — 7100000000 HC PACU RECOVERY - FIRST 15 MIN: Performed by: COLON & RECTAL SURGERY

## 2022-10-11 PROCEDURE — 3600000012 HC SURGERY LEVEL 2 ADDTL 15MIN: Performed by: COLON & RECTAL SURGERY

## 2022-10-11 PROCEDURE — 6360000002 HC RX W HCPCS: Performed by: COLON & RECTAL SURGERY

## 2022-10-11 PROCEDURE — 3600000002 HC SURGERY LEVEL 2 BASE: Performed by: COLON & RECTAL SURGERY

## 2022-10-11 PROCEDURE — C9290 INJ, BUPIVACAINE LIPOSOME: HCPCS | Performed by: COLON & RECTAL SURGERY

## 2022-10-11 PROCEDURE — 2709999900 HC NON-CHARGEABLE SUPPLY: Performed by: COLON & RECTAL SURGERY

## 2022-10-11 PROCEDURE — 6360000002 HC RX W HCPCS: Performed by: NURSE ANESTHETIST, CERTIFIED REGISTERED

## 2022-10-11 PROCEDURE — 2500000003 HC RX 250 WO HCPCS: Performed by: NURSE ANESTHETIST, CERTIFIED REGISTERED

## 2022-10-11 PROCEDURE — 3700000001 HC ADD 15 MINUTES (ANESTHESIA): Performed by: COLON & RECTAL SURGERY

## 2022-10-11 PROCEDURE — 7100000010 HC PHASE II RECOVERY - FIRST 15 MIN: Performed by: COLON & RECTAL SURGERY

## 2022-10-11 RX ORDER — ONDANSETRON 2 MG/ML
4 INJECTION INTRAMUSCULAR; INTRAVENOUS
Status: CANCELLED | OUTPATIENT
Start: 2022-10-11 | End: 2022-10-12

## 2022-10-11 RX ORDER — HYDROCODONE BITARTRATE AND ACETAMINOPHEN 5; 325 MG/1; MG/1
1 TABLET ORAL EVERY 8 HOURS PRN
Qty: 15 TABLET | Refills: 0 | Status: SHIPPED | OUTPATIENT
Start: 2022-10-11 | End: 2022-10-16

## 2022-10-11 RX ORDER — SODIUM CHLORIDE 0.9 % (FLUSH) 0.9 %
5-40 SYRINGE (ML) INJECTION EVERY 12 HOURS SCHEDULED
Status: DISCONTINUED | OUTPATIENT
Start: 2022-10-11 | End: 2022-10-11 | Stop reason: HOSPADM

## 2022-10-11 RX ORDER — PROPOFOL 10 MG/ML
INJECTION, EMULSION INTRAVENOUS PRN
Status: DISCONTINUED | OUTPATIENT
Start: 2022-10-11 | End: 2022-10-11 | Stop reason: SDUPTHER

## 2022-10-11 RX ORDER — SODIUM CHLORIDE 9 MG/ML
INJECTION, SOLUTION INTRAVENOUS PRN
Status: CANCELLED | OUTPATIENT
Start: 2022-10-11

## 2022-10-11 RX ORDER — LIDOCAINE HYDROCHLORIDE 10 MG/ML
1 INJECTION, SOLUTION EPIDURAL; INFILTRATION; INTRACAUDAL; PERINEURAL
Status: DISCONTINUED | OUTPATIENT
Start: 2022-10-11 | End: 2022-10-11 | Stop reason: HOSPADM

## 2022-10-11 RX ORDER — ONDANSETRON 2 MG/ML
INJECTION INTRAMUSCULAR; INTRAVENOUS PRN
Status: DISCONTINUED | OUTPATIENT
Start: 2022-10-11 | End: 2022-10-11 | Stop reason: SDUPTHER

## 2022-10-11 RX ORDER — SODIUM CHLORIDE, SODIUM LACTATE, POTASSIUM CHLORIDE, CALCIUM CHLORIDE 600; 310; 30; 20 MG/100ML; MG/100ML; MG/100ML; MG/100ML
INJECTION, SOLUTION INTRAVENOUS CONTINUOUS
Status: DISCONTINUED | OUTPATIENT
Start: 2022-10-11 | End: 2022-10-11 | Stop reason: HOSPADM

## 2022-10-11 RX ORDER — SODIUM CHLORIDE 0.9 % (FLUSH) 0.9 %
5-40 SYRINGE (ML) INJECTION PRN
Status: CANCELLED | OUTPATIENT
Start: 2022-10-11

## 2022-10-11 RX ORDER — SODIUM CHLORIDE 0.9 % (FLUSH) 0.9 %
5-40 SYRINGE (ML) INJECTION EVERY 12 HOURS SCHEDULED
Status: CANCELLED | OUTPATIENT
Start: 2022-10-11

## 2022-10-11 RX ORDER — SODIUM CHLORIDE 0.9 % (FLUSH) 0.9 %
5-40 SYRINGE (ML) INJECTION PRN
Status: DISCONTINUED | OUTPATIENT
Start: 2022-10-11 | End: 2022-10-11 | Stop reason: HOSPADM

## 2022-10-11 RX ORDER — HYDROMORPHONE HYDROCHLORIDE 1 MG/ML
0.5 INJECTION, SOLUTION INTRAMUSCULAR; INTRAVENOUS; SUBCUTANEOUS EVERY 5 MIN PRN
Status: CANCELLED | OUTPATIENT
Start: 2022-10-11

## 2022-10-11 RX ORDER — ROCURONIUM BROMIDE 10 MG/ML
INJECTION, SOLUTION INTRAVENOUS PRN
Status: DISCONTINUED | OUTPATIENT
Start: 2022-10-11 | End: 2022-10-11 | Stop reason: SDUPTHER

## 2022-10-11 RX ORDER — SODIUM CHLORIDE 9 MG/ML
INJECTION, SOLUTION INTRAVENOUS PRN
Status: DISCONTINUED | OUTPATIENT
Start: 2022-10-11 | End: 2022-10-11 | Stop reason: HOSPADM

## 2022-10-11 RX ORDER — DOCUSATE SODIUM 100 MG/1
100 CAPSULE, LIQUID FILLED ORAL 2 TIMES DAILY
Qty: 60 CAPSULE | Refills: 0 | Status: SHIPPED | OUTPATIENT
Start: 2022-10-11 | End: 2022-11-10

## 2022-10-11 RX ORDER — DEXAMETHASONE SODIUM PHOSPHATE 10 MG/ML
INJECTION, SOLUTION INTRAMUSCULAR; INTRAVENOUS PRN
Status: DISCONTINUED | OUTPATIENT
Start: 2022-10-11 | End: 2022-10-11 | Stop reason: SDUPTHER

## 2022-10-11 RX ORDER — LIDOCAINE HYDROCHLORIDE 20 MG/ML
INJECTION, SOLUTION EPIDURAL; INFILTRATION; INTRACAUDAL; PERINEURAL PRN
Status: DISCONTINUED | OUTPATIENT
Start: 2022-10-11 | End: 2022-10-11 | Stop reason: SDUPTHER

## 2022-10-11 RX ORDER — MIDAZOLAM HYDROCHLORIDE 1 MG/ML
INJECTION INTRAMUSCULAR; INTRAVENOUS PRN
Status: DISCONTINUED | OUTPATIENT
Start: 2022-10-11 | End: 2022-10-11 | Stop reason: SDUPTHER

## 2022-10-11 RX ORDER — SODIUM CHLORIDE 9 MG/ML
INJECTION, SOLUTION INTRAVENOUS CONTINUOUS
Status: DISCONTINUED | OUTPATIENT
Start: 2022-10-11 | End: 2022-10-11 | Stop reason: HOSPADM

## 2022-10-11 RX ORDER — OXYCODONE HYDROCHLORIDE 5 MG/1
5 TABLET ORAL
Status: CANCELLED | OUTPATIENT
Start: 2022-10-11 | End: 2022-10-12

## 2022-10-11 RX ORDER — ONDANSETRON 4 MG/1
4 TABLET, ORALLY DISINTEGRATING ORAL 3 TIMES DAILY PRN
Qty: 21 TABLET | Refills: 0 | Status: SHIPPED | OUTPATIENT
Start: 2022-10-11

## 2022-10-11 RX ORDER — FENTANYL CITRATE 50 UG/ML
25 INJECTION, SOLUTION INTRAMUSCULAR; INTRAVENOUS EVERY 5 MIN PRN
Status: CANCELLED | OUTPATIENT
Start: 2022-10-11

## 2022-10-11 RX ORDER — FENTANYL CITRATE 50 UG/ML
INJECTION, SOLUTION INTRAMUSCULAR; INTRAVENOUS PRN
Status: DISCONTINUED | OUTPATIENT
Start: 2022-10-11 | End: 2022-10-11 | Stop reason: SDUPTHER

## 2022-10-11 RX ADMIN — Medication 75 MCG: at 13:13

## 2022-10-11 RX ADMIN — ONDANSETRON 4 MG: 2 INJECTION, SOLUTION INTRAMUSCULAR; INTRAVENOUS at 13:40

## 2022-10-11 RX ADMIN — SODIUM CHLORIDE, POTASSIUM CHLORIDE, SODIUM LACTATE AND CALCIUM CHLORIDE: 600; 310; 30; 20 INJECTION, SOLUTION INTRAVENOUS at 11:48

## 2022-10-11 RX ADMIN — MIDAZOLAM 2 MG: 1 INJECTION INTRAMUSCULAR; INTRAVENOUS at 13:08

## 2022-10-11 RX ADMIN — ROCURONIUM BROMIDE 40 MG: 10 INJECTION, SOLUTION INTRAVENOUS at 13:14

## 2022-10-11 RX ADMIN — DEXAMETHASONE SODIUM PHOSPHATE 10 MG: 10 INJECTION INTRAMUSCULAR; INTRAVENOUS at 13:33

## 2022-10-11 RX ADMIN — PROPOFOL 180 MG: 10 INJECTION, EMULSION INTRAVENOUS at 13:13

## 2022-10-11 RX ADMIN — Medication 25 MCG: at 13:32

## 2022-10-11 RX ADMIN — SUGAMMADEX 150 MG: 100 INJECTION, SOLUTION INTRAVENOUS at 14:00

## 2022-10-11 RX ADMIN — LIDOCAINE HYDROCHLORIDE 100 MG: 20 INJECTION, SOLUTION EPIDURAL; INFILTRATION; INTRACAUDAL; PERINEURAL at 13:13

## 2022-10-11 ASSESSMENT — PAIN - FUNCTIONAL ASSESSMENT: PAIN_FUNCTIONAL_ASSESSMENT: 0-10

## 2022-10-11 ASSESSMENT — PAIN DESCRIPTION - DESCRIPTORS: DESCRIPTORS: BURNING

## 2022-10-11 NOTE — OP NOTE
Operative Note      Patient: Genesis Barboza  YOB: 2000  MRN: [id number]    Date of Procedure: 10/11/2022    Pre-Op Diagnosis: Anal fistula [K60.3]    Post-Op Diagnosis: Same       PROCEDURE:  Anal Fistulectomy      Surgeon(s):  Robles Portillo MD    Assistant:   * No surgical staff found *    Anesthesia: General    Estimated Blood Loss (mL): 2cc    Complications: None    Specimens:   * No specimens in log *    Implants:  * No implants in log *      Drains: * No LDAs found *    Findings: left anterolateral subcutaneous fistula with small abscess    Indication:  The patient is an 78-year-old male with history of Crohn's disease and multiple episodes of perianal fistulae. An exam under anesthesia and fistulectomy was recommended at this time. The procedure, alternatives, risks were discussed and all questions were answered. Patient wished to proceed with surgery. Consent was reviewed and signed by the patient. Detailed Description of Procedure: The patient was taken to the operating room. While on the hospital bed, general anesthesia was induced under anesthesia guidelines and the patient was intubated without difficulty. He was then transferred to the operating room table in prone jackknife position. The perianal region was then prepped and draped in the usual sterile fashion. A timeout was performed verifying the correct patient, positioning, procedure, equipment prior to beginning. A digital rectal exam was done which confirmed grade 1 internal hemorrhoids on the left lateral column. A pit was noted at the left anterolateral region distal to the dentate line. In the left anterior perianal region, induration was appreciated and about 4 cm away from the anus was a another pit near the area of induration. A fistula probe was used to find and follow the tract connecting the 2 identified pits.   The fistula was identified as superficial and only through the subcutaneous tissue without violating the sphincter. We proceeded with a fistulectomy using electrocautery to unroof the tract. Much of the tract had been epithelialized. A small, subcentimeter area of induration was also appreciated adjacent to the unroofed tract with a small amount of purulence appreciated. This was debrided and washed out. Hemostasis was achieved through electrocautery. No other fistulae were identified at this time. We proceeded with marsupializing the skin edges of the tract with the exception of the skin over and adjacent to the area of purulence. Bacitracin was then applied over the surgical site. This concluded the procedure. All instruments and sponges were accounted for at the end of the case. The patient tolerated the procedure well, was extubated, and was taken to the postanesthesia care unit in stable condition. Dr. Marvel Veliz was present and scrubbed for the entire case. Electronically signed by Alfredo Shipman DO on 10/11/2022 at 12:08 PM    This note is created with the assistance of a speech recognition program.  While intending to generate a document that actually reflects the content of the visit, the document can still have some errors including those of syntax and sound a like substitutions which may escape proof reading. In such instances, actual meaning can be extrapolated by contextual diversion. I Dr. Marvel Veliz was present throughout and performed the entire procedure. Robles Chan  Colorectal Surgery

## 2022-10-11 NOTE — BRIEF OP NOTE
Brief Postoperative Note      Patient: Won Brady  YOB: 2000  MRN: [id number]    Date of Procedure: 10/11/2022    Pre-Op Diagnosis: Anal fistula [K60.3]    Post-Op Diagnosis: Same       PROCEDURE:  Anal Fistulectomy    Surgeon(s):  Raheem Rivera MD    Assistant:  Dawson Greene DO    Anesthesia: General    Estimated Blood Loss (mL): 2cc    Complications: None    Specimens:   * No specimens in log *    Implants:  * No implants in log *      Drains: * No LDAs found *    Findings: left anterolateral subcutaneous fistula with small abscess      Electronically signed by Alessandra Silverman DO on 10/11/2022 at 12:08 PM

## 2022-10-11 NOTE — DISCHARGE INSTRUCTIONS
Patient Discharge Instructions  Discharge Date:  10/11/2022    HYGEINE: Satya Avinash to shower, no soaking in a tub/pool until seen at your follow up appointment. Clean incision daily with gentle soap and water, do not use alcohol/peroxide or any harsh cleansers. DRIVING: No driving while taking narcotic medications or while in pain    ACTIVITY: As tolerated    DIET: Resume your normal diet as advised by your PCP. MEDICATIONS: Take all medications as prescribed. SPECIAL INSTRUCTIONS:     Follow up with Dr. Diana Kuo in 10-14 days, call the clinic for appointment. Call sooner if fever above 100.4 degrees Farenheit, increase in swelling or redness, thick purulent discharge or pain not controlled with medications.

## 2022-10-11 NOTE — H&P
Interval H&P Note    Pt Name: Armando Bearden  MRN: [id number]  YOB: 2000  Date of evaluation: 10/11/2022      [x] I have reviewed the hard copy general surgery progress note by Dr. Cecile Yanez dated 9/16/2022 labeled in paper chart for an Interval History and Physical note. [x] I have examined  Dexter Fernández  There are no changes to the patient who is scheduled for ANAL FISSURECTOMY FISTULOTOMY by Lor Aguilera MD for Anal fistula [K60.3]. The patient denies new health changes, fever, chills, wheezing, cough, increased SOB, chest pain, open sores or wounds. Denies hx diabetes and taking any blood thinning medications in the last 10 days. Vital signs: BP (!) 145/72   Pulse 78   Temp 98.9 °F (37.2 °C)   Resp 16   Ht 5' 10\" (1.778 m)   Wt 152 lb (68.9 kg)   SpO2 98%   BMI 21.81 kg/m²     Allergies:  Patient has no known allergies. Medications:    Prior to Admission medications    Medication Sig Start Date End Date Taking? Authorizing Provider   lidocaine (XYLOCAINE) 5 % ointment Apply topically 2 times daily Apply topically as needed.  8/10/22   Cherelle Sahu MD   emtricitabine-tenofovir (TRUVADA) 200-300 MG per tablet Take 1 tablet by mouth daily 4/17/22   Мария Mata PA-C   dolutegravir sodium (TIVICAY) 50 MG tablet Take 1 tablet by mouth daily 4/17/22   Мария Mata PA-C   hyoscyamine (ANASPAZ;LEVSIN) 125 MCG tablet Take 0.125 mg by mouth every 6 hours as needed 9/23/21 12/22/21  Historical Provider, MD   inFLIXimab (REMICADE) 100 MG injection Infuse 5 mg/kg intravenously See Admin Instructions Every other month    Historical Provider, MD   acetaminophen (TYLENOL) 500 MG tablet Take 500 mg by mouth every 6 hours as needed for Pain    Historical Provider, MD         Past Medical History:     Past Medical History:   Diagnosis Date    Anal fissure     Bipolar 2 disorder ([address]     Colitis     Crohn's colitis ([address]     Depression     Diabetes mellitus ([address] Meagan-rectal abscess     Rectal pain         Past Surgical History:     Past Surgical History:   Procedure Laterality Date    COLONOSCOPY  2021    multiple colonoscopies    COLONOSCOPY N/A 2021    COLONOSCOPY WITH BIOPSY performed by Pushpa Mathew MD at [facility] 2  2020    rectal abcess excision, Dr. Summer Larry 2021    Kristenbernadette Alexandra performed by Pushpa Mathew MD at  [address]         Social History:     Social History     Socioeconomic History    Marital status: Single     Spouse name: None    Number of children: None    Years of education: None    Highest education level: None   Tobacco Use    Smoking status: Former     Types: Cigarettes     Quit date: 10/6/2021     Years since quittin.0    Smokeless tobacco: Never    Tobacco comments:     1-2 cigarettes per month   Vaping Use    Vaping Use: Former    Substances: Nicotine, Flavoring    Devices: Disposable   Substance and Sexual Activity    Alcohol use: Yes     Comment: occasional    Drug use: Yes     Types: Marijuana Dietra Due)    Sexual activity: Not Currently       Family History:     Family History   Problem Relation Age of Onset    Irritable Bowel Syndrome Mother     Diabetes Father        This is a 25 y.o. male who is pleasant, cooperative, alert and oriented x3, in no acute distress. Heart: Heart sounds are normal.  HR 78 regular rate and rhythm without murmur, gallop or rub. Lungs: Normal respiratory effort with equal expansion, good air exchange, unlabored and clear to auscultation without wheezes or rales bilaterally   Abdomen: soft, nontender, nondistended with bowel sounds active. Labs:  No results for input(s): HGB, HCT, WBC, MCV, PLT, NA, K, CL, CO2, BUN, CREATININE, GLUCOSE, INR, PROTIME, APTT, AST, ALT, LABALBU, HCG in the last 720 hours. No results for input(s): COVID19 in the last 720 hours.     Liz Maria, APRN - CNP  Electronically signed 10/11/2022 at 11:49 AM

## 2022-10-11 NOTE — ANESTHESIA POSTPROCEDURE EVALUATION
Department of Anesthesiology  Postprocedure Note    Patient: Dulce Cisneros  MRN: [id number]  [place]: 2000  Date of evaluation: 10/11/2022      Procedure Summary     Date: 10/11/22 Room / Location: [address]'[facility] - INPATIENT    Anesthesia Start: 2986 Anesthesia Stop: 6675    Procedure: ANAL FISSURECTOMY FISTULOTOMY (Anus) Diagnosis:       Anal fistula      (Anal fistula [K60.3])    Surgeons: Chuck Romero MD Responsible Provider: Byron Emery DO    Anesthesia Type: general ASA Status: 2          Anesthesia Type: No value filed.     Nataliya Phase I: Nataliya Score: 10    Nataliya Phase II: Nataliya Score: 9      Anesthesia Post Evaluation    Patient location during evaluation: PACU  Patient participation: complete - patient participated  Level of consciousness: awake and alert  Airway patency: patent  Nausea & Vomiting: no nausea and no vomiting  Complications: no  Cardiovascular status: hemodynamically stable  Respiratory status: acceptable  Hydration status: stable

## 2022-10-11 NOTE — ANESTHESIA PRE PROCEDURE
Department of Anesthesiology  Preprocedure Note       Name:  Janett Mccall   Age:  25 y.o.  :  2000                                          MRN:  [id number]         Date:  10/11/2022      Surgeon: Mira Talamantes):  Marie Murray MD    Procedure: Procedure(s):  ANAL FISSURECTOMY FISTULOTOMY    Medications prior to admission:   Prior to Admission medications    Medication Sig Start Date End Date Taking? Authorizing Provider   docusate sodium (COLACE) 100 MG capsule Take 1 capsule by mouth 2 times daily 10/11/22 11/10/22 Yes Bia Jose,    HYDROcodone-acetaminophen (NORCO) 5-325 MG per tablet Take 1 tablet by mouth every 8 hours as needed for Pain for up to 5 days. Intended supply: 5 days. Take lowest dose possible to manage pain 10/11/22 10/16/22 Yes Sussy Li DO   ondansetron (ZOFRAN-ODT) 4 MG disintegrating tablet Take 1 tablet by mouth 3 times daily as needed for Nausea or Vomiting 10/11/22  Yes Sussy Li DO   lidocaine (XYLOCAINE) 5 % ointment Apply topically 2 times daily Apply topically as needed.  8/10/22   Quincy Thacker MD   emtricitabine-tenofovir (TRUVADA) 200-300 MG per tablet Take 1 tablet by mouth daily 22   Shantal Calero PA-C   dolutegravir sodium (TIVICAY) 50 MG tablet Take 1 tablet by mouth daily 22   Shantal Calero PA-C   hyoscyamine (ANASPAZ;LEVSIN) 125 MCG tablet Take 0.125 mg by mouth every 6 hours as needed 21  Historical Provider, MD   inFLIXimab (REMICADE) 100 MG injection Infuse 5 mg/kg intravenously See Admin Instructions Every other month    Historical Provider, MD   acetaminophen (TYLENOL) 500 MG tablet Take 500 mg by mouth every 6 hours as needed for Pain    Historical Provider, MD       Current medications:    Current Facility-Administered Medications   Medication Dose Route Frequency Provider Last Rate Last Admin    lidocaine PF 1 % injection 1 mL  1 mL IntraDERmal Once PRN Ariane Donato MD        0.9 % sodium chloride infusion   IntraVENous Continuous Aníbal Lam MD        lactated ringers infusion   IntraVENous Continuous Aníbal Lam  mL/hr at 10/11/22 1148 New Bag at 10/11/22 1148    sodium chloride flush 0.9 % injection 5-40 mL  5-40 mL IntraVENous 2 times per day Aníbal Lam MD        sodium chloride flush 0.9 % injection 5-40 mL  5-40 mL IntraVENous PRN Aníbal Lam MD        0.9 % sodium chloride infusion   IntraVENous PRN Aníbal Lam MD           Allergies:  No Known Allergies    Problem List:    Patient Active Problem List   Diagnosis Code    IBD (inflammatory bowel disease) K52.9       Past Medical History:        Diagnosis Date    Anal fissure     [facility]     Colitis     Crohn's colitis ([facility]     Depression     Diabetes mellitus ([facility]     Meagan-rectal abscess     Rectal pain        Past Surgical History:        Procedure Laterality Date    COLONOSCOPY  2021    multiple colonoscopies    COLONOSCOPY N/A 2021    COLONOSCOPY WITH BIOPSY performed by Carlos Hay MD at [address] [place] [address]  2020    rectal abcess excision, Dr. Moore [place] N/A 2021    Georgeeugenea Misty performed by Carlos Hay MD at [address] [place] [address]         Social History:    Social History     Tobacco Use    Smoking status: Former     Types: Cigarettes     Quit date: 10/6/2021     Years since quittin.0    Smokeless tobacco: Never    Tobacco comments:     1-2 cigarettes per month   Substance Use Topics    Alcohol use: Yes     Comment: occasional                                Counseling given: Not Answered  Tobacco comments: 1-2 cigarettes per month      Vital Signs (Current):   Vitals:    10/11/22 1128 10/11/22 1133   BP:  (!) 145/72   Pulse:  78   Resp:  16   Temp:  98.9 °F (37.2 °C)   SpO2:  98%   Weight: 152 lb (68.9 kg)    Height: 5' 10\" (1.778 m)                                               BP Readings from Last 3 Encounters:   10/11/22 (!) 145/72   09/12/22 110/67   08/10/22 (!) 109/53       NPO Status: Time of last liquid consumption: 0200                        Time of last solid consumption: 2100                        Date of last liquid consumption: 10/11/22                        Date of last solid food consumption: 10/09/22    BMI:   Wt Readings from Last 3 Encounters:   10/11/22 152 lb (68.9 kg)   08/10/22 156 lb (70.8 kg)   07/05/22 153 lb (69.4 kg)     Body mass index is 21.81 kg/m². CBC:   Lab Results   Component Value Date/Time    WBC 6.5 07/05/2022 05:15 PM    RBC 4.45 07/05/2022 05:15 PM    HGB 14.1 07/05/2022 05:15 PM    HCT 43.8 07/05/2022 05:15 PM    MCV 98.4 07/05/2022 05:15 PM    RDW 11.1 07/05/2022 05:15 PM     07/05/2022 05:15 PM       CMP:   Lab Results   Component Value Date/Time     07/05/2022 05:15 PM    K 4.1 07/05/2022 05:15 PM     07/05/2022 05:15 PM    CO2 29 07/05/2022 05:15 PM    BUN 10 07/05/2022 05:15 PM    CREATININE 1.10 07/05/2022 05:15 PM    GFRAA >60 07/05/2022 05:15 PM    LABGLOM >60 07/05/2022 05:15 PM    GLUCOSE 92 07/05/2022 05:15 PM    PROT 7.8 04/06/2022 01:33 PM    CALCIUM 9.3 07/05/2022 05:15 PM    BILITOT 0.77 04/06/2022 01:33 PM    ALKPHOS 84 04/06/2022 01:33 PM    AST 16 04/06/2022 01:33 PM    ALT 14 04/06/2022 01:33 PM       POC Tests: No results for input(s): POCGLU, POCNA, POCK, POCCL, POCBUN, POCHEMO, POCHCT in the last 72 hours.     Coags: No results found for: PROTIME, INR, APTT    HCG (If Applicable): No results found for: PREGTESTUR, PREGSERUM, HCG, HCGQUANT     ABGs: No results found for: PHART, PO2ART, VGK0RUU, VZN2TKA, BEART, U6GJXHYX     Type & Screen (If Applicable):  No results found for: LABABO, LABRH    Drug/Infectious Status (If Applicable):  Lab Results   Component Value Date/Time    HEPCAB NONREACTIVE 04/12/2021 11:00 AM       COVID-19 Screening (If Applicable):   Lab Results   Component Value Date/Time    COVID19 Not Detected 03/28/2021 09:20 AM           Anesthesia Evaluation  Patient summary reviewed and Nursing notes reviewed no history of anesthetic complications:   Airway: Mallampati: II  TM distance: >3 FB   Neck ROM: full  Mouth opening: > = 3 FB   Dental: normal exam         Pulmonary:normal exam        (-) COPD and asthma                           Cardiovascular:  Exercise tolerance: good (>4 METS),       (-) hypertension, past MI and CAD        Rate: normal                    Neuro/Psych:   (+) psychiatric history:            GI/Hepatic/Renal:        (-) GERD       Endo/Other:    (+) Diabetes, . Abdominal:             Vascular: Other Findings:           Anesthesia Plan      general     ASA 2       Induction: intravenous. MIPS: Prophylactic antiemetics administered. Anesthetic plan and risks discussed with patient. Plan discussed with CRNA.     Attending anesthesiologist reviewed and agrees with Preprocedure content                Mary Alice Vasquez DO   10/11/2022

## 2022-11-07 ENCOUNTER — HOSPITAL ENCOUNTER (OUTPATIENT)
Dept: INFUSION THERAPY | Age: 22
Discharge: HOME | End: 2022-11-07
Payer: MEDICARE

## 2022-11-07 VITALS
RESPIRATION RATE: 16 BRPM | SYSTOLIC BLOOD PRESSURE: 106 MMHG | TEMPERATURE: 97.8 F | HEART RATE: 62 BPM | DIASTOLIC BLOOD PRESSURE: 66 MMHG

## 2022-11-07 DIAGNOSIS — K52.9 IBD (INFLAMMATORY BOWEL DISEASE): Primary | ICD-10-CM

## 2022-11-07 PROCEDURE — 6370000000 HC RX 637 (ALT 250 FOR IP): Performed by: INTERNAL MEDICINE

## 2022-11-07 PROCEDURE — 96413 CHEMO IV INFUSION 1 HR: CPT

## 2022-11-07 PROCEDURE — 2580000003 HC RX 258: Performed by: INTERNAL MEDICINE

## 2022-11-07 PROCEDURE — 96415 CHEMO IV INFUSION ADDL HR: CPT

## 2022-11-07 PROCEDURE — 6360000002 HC RX W HCPCS: Performed by: INTERNAL MEDICINE

## 2022-11-07 RX ORDER — ACETAMINOPHEN 325 MG/1
650 TABLET ORAL ONCE
OUTPATIENT
Start: 2023-01-02

## 2022-11-07 RX ORDER — EPINEPHRINE 1 MG/ML
0.3 INJECTION, SOLUTION, CONCENTRATE INTRAVENOUS PRN
OUTPATIENT
Start: 2023-01-02

## 2022-11-07 RX ORDER — CETIRIZINE HYDROCHLORIDE 10 MG/1
10 TABLET ORAL ONCE
Status: COMPLETED | OUTPATIENT
Start: 2022-11-07 | End: 2022-11-07

## 2022-11-07 RX ORDER — DIPHENHYDRAMINE HCL 25 MG
25 TABLET ORAL ONCE
OUTPATIENT
Start: 2023-01-02

## 2022-11-07 RX ORDER — DIPHENHYDRAMINE HYDROCHLORIDE 50 MG/ML
50 INJECTION INTRAMUSCULAR; INTRAVENOUS
OUTPATIENT
Start: 2023-01-02

## 2022-11-07 RX ORDER — HEPARIN SODIUM (PORCINE) LOCK FLUSH IV SOLN 100 UNIT/ML 100 UNIT/ML
500 SOLUTION INTRAVENOUS PRN
OUTPATIENT
Start: 2023-01-02

## 2022-11-07 RX ORDER — SODIUM CHLORIDE 0.9 % (FLUSH) 0.9 %
5-40 SYRINGE (ML) INJECTION PRN
OUTPATIENT
Start: 2023-01-02

## 2022-11-07 RX ORDER — ACETAMINOPHEN 325 MG/1
650 TABLET ORAL
OUTPATIENT
Start: 2023-01-02

## 2022-11-07 RX ORDER — ALBUTEROL SULFATE 90 UG/1
4 AEROSOL, METERED RESPIRATORY (INHALATION) PRN
OUTPATIENT
Start: 2023-01-02

## 2022-11-07 RX ORDER — ONDANSETRON 2 MG/ML
8 INJECTION INTRAMUSCULAR; INTRAVENOUS
OUTPATIENT
Start: 2023-01-02

## 2022-11-07 RX ORDER — SODIUM CHLORIDE 9 MG/ML
5-250 INJECTION, SOLUTION INTRAVENOUS PRN
OUTPATIENT
Start: 2023-01-02

## 2022-11-07 RX ORDER — FAMOTIDINE 10 MG/ML
20 INJECTION, SOLUTION INTRAVENOUS
OUTPATIENT
Start: 2023-01-02

## 2022-11-07 RX ORDER — SODIUM CHLORIDE 9 MG/ML
5-250 INJECTION, SOLUTION INTRAVENOUS PRN
Status: DISCONTINUED | OUTPATIENT
Start: 2022-11-07 | End: 2022-11-08 | Stop reason: HOSPADM

## 2022-11-07 RX ORDER — DIPHENHYDRAMINE HCL 25 MG
25 TABLET ORAL ONCE
Status: COMPLETED | OUTPATIENT
Start: 2022-11-07 | End: 2022-11-07

## 2022-11-07 RX ORDER — SODIUM CHLORIDE 9 MG/ML
INJECTION, SOLUTION INTRAVENOUS CONTINUOUS
OUTPATIENT
Start: 2023-01-02

## 2022-11-07 RX ORDER — CETIRIZINE HYDROCHLORIDE 10 MG/1
10 TABLET ORAL ONCE
OUTPATIENT
Start: 2023-01-02

## 2022-11-07 RX ORDER — ACETAMINOPHEN 325 MG/1
650 TABLET ORAL ONCE
Status: COMPLETED | OUTPATIENT
Start: 2022-11-07 | End: 2022-11-07

## 2022-11-07 RX ADMIN — CETIRIZINE HYDROCHLORIDE 10 MG: 10 TABLET, FILM COATED ORAL at 13:26

## 2022-11-07 RX ADMIN — DIPHENHYDRAMINE HYDROCHLORIDE 25 MG: 25 TABLET ORAL at 13:26

## 2022-11-07 RX ADMIN — SODIUM CHLORIDE 50 ML/HR: 9 INJECTION, SOLUTION INTRAVENOUS at 13:26

## 2022-11-07 RX ADMIN — INFLIXIMAB 300 MG: 100 INJECTION, POWDER, LYOPHILIZED, FOR SOLUTION INTRAVENOUS at 13:46

## 2022-11-07 RX ADMIN — ACETAMINOPHEN 650 MG: 325 TABLET ORAL at 13:26

## 2022-11-07 ASSESSMENT — PAIN DESCRIPTION - LOCATION: LOCATION: ABDOMEN

## 2022-11-07 ASSESSMENT — PAIN SCALES - GENERAL: PAINLEVEL_OUTOF10: 5

## 2022-11-07 NOTE — PROGRESS NOTES
Patient here for renflexis. Vitals stable. C/o pain in abdomen. He stated that he gets this the day before and day of transfusions. He tolerated treatment well and was discharged home in stable condition. He is due to return 1/9 for next treatment.

## 2022-12-16 VITALS
BODY MASS INDEX: 22.62 KG/M2 | DIASTOLIC BLOOD PRESSURE: 82 MMHG | TEMPERATURE: 101.8 F | RESPIRATION RATE: 12 BRPM | WEIGHT: 158 LBS | HEIGHT: 70 IN | HEART RATE: 102 BPM | OXYGEN SATURATION: 99 % | SYSTOLIC BLOOD PRESSURE: 158 MMHG

## 2022-12-16 ASSESSMENT — PAIN - FUNCTIONAL ASSESSMENT: PAIN_FUNCTIONAL_ASSESSMENT: 0-10

## 2022-12-16 ASSESSMENT — PAIN DESCRIPTION - LOCATION: LOCATION: HEAD

## 2022-12-16 ASSESSMENT — PAIN SCALES - GENERAL: PAINLEVEL_OUTOF10: 8

## 2022-12-17 ENCOUNTER — HOSPITAL ENCOUNTER (EMERGENCY)
Age: 22
Discharge: LEFT BEFORE DISCHARGE | End: 2022-12-17

## 2023-01-09 ENCOUNTER — HOSPITAL ENCOUNTER (OUTPATIENT)
Dept: INFUSION THERAPY | Age: 23
Discharge: HOME | End: 2023-01-09
Payer: MEDICARE

## 2023-01-09 VITALS
HEART RATE: 81 BPM | SYSTOLIC BLOOD PRESSURE: 128 MMHG | RESPIRATION RATE: 16 BRPM | DIASTOLIC BLOOD PRESSURE: 81 MMHG | TEMPERATURE: 98 F

## 2023-01-09 DIAGNOSIS — K52.9 IBD (INFLAMMATORY BOWEL DISEASE): Primary | ICD-10-CM

## 2023-01-09 PROCEDURE — 6360000002 HC RX W HCPCS: Performed by: INTERNAL MEDICINE

## 2023-01-09 PROCEDURE — 96415 CHEMO IV INFUSION ADDL HR: CPT

## 2023-01-09 PROCEDURE — 2580000003 HC RX 258: Performed by: INTERNAL MEDICINE

## 2023-01-09 PROCEDURE — 6370000000 HC RX 637 (ALT 250 FOR IP): Performed by: INTERNAL MEDICINE

## 2023-01-09 PROCEDURE — 96413 CHEMO IV INFUSION 1 HR: CPT

## 2023-01-09 RX ORDER — DIPHENHYDRAMINE HYDROCHLORIDE 50 MG/ML
50 INJECTION INTRAMUSCULAR; INTRAVENOUS
OUTPATIENT
Start: 2023-02-27

## 2023-01-09 RX ORDER — EPINEPHRINE 1 MG/ML
0.3 INJECTION, SOLUTION, CONCENTRATE INTRAVENOUS PRN
OUTPATIENT
Start: 2023-02-27

## 2023-01-09 RX ORDER — SODIUM CHLORIDE 9 MG/ML
5-250 INJECTION, SOLUTION INTRAVENOUS PRN
Status: DISCONTINUED | OUTPATIENT
Start: 2023-01-09 | End: 2023-01-10 | Stop reason: HOSPADM

## 2023-01-09 RX ORDER — ACETAMINOPHEN 325 MG/1
650 TABLET ORAL
OUTPATIENT
Start: 2023-02-27

## 2023-01-09 RX ORDER — ALBUTEROL SULFATE 90 UG/1
4 AEROSOL, METERED RESPIRATORY (INHALATION) PRN
OUTPATIENT
Start: 2023-02-27

## 2023-01-09 RX ORDER — ONDANSETRON 2 MG/ML
8 INJECTION INTRAMUSCULAR; INTRAVENOUS
OUTPATIENT
Start: 2023-02-27

## 2023-01-09 RX ORDER — HEPARIN SODIUM (PORCINE) LOCK FLUSH IV SOLN 100 UNIT/ML 100 UNIT/ML
500 SOLUTION INTRAVENOUS PRN
OUTPATIENT
Start: 2023-02-27

## 2023-01-09 RX ORDER — FAMOTIDINE 10 MG/ML
20 INJECTION, SOLUTION INTRAVENOUS
OUTPATIENT
Start: 2023-02-27

## 2023-01-09 RX ORDER — DIPHENHYDRAMINE HCL 25 MG
25 TABLET ORAL ONCE
Status: COMPLETED | OUTPATIENT
Start: 2023-01-09 | End: 2023-01-09

## 2023-01-09 RX ORDER — ACETAMINOPHEN 325 MG/1
650 TABLET ORAL ONCE
Status: COMPLETED | OUTPATIENT
Start: 2023-01-09 | End: 2023-01-09

## 2023-01-09 RX ORDER — SODIUM CHLORIDE 9 MG/ML
5-250 INJECTION, SOLUTION INTRAVENOUS PRN
OUTPATIENT
Start: 2023-02-27

## 2023-01-09 RX ORDER — SODIUM CHLORIDE 0.9 % (FLUSH) 0.9 %
5-40 SYRINGE (ML) INJECTION PRN
OUTPATIENT
Start: 2023-02-27

## 2023-01-09 RX ORDER — DIPHENHYDRAMINE HCL 25 MG
25 TABLET ORAL ONCE
OUTPATIENT
Start: 2023-02-27

## 2023-01-09 RX ORDER — SODIUM CHLORIDE 9 MG/ML
INJECTION, SOLUTION INTRAVENOUS CONTINUOUS
OUTPATIENT
Start: 2023-02-27

## 2023-01-09 RX ORDER — CETIRIZINE HYDROCHLORIDE 10 MG/1
10 TABLET ORAL ONCE
Status: COMPLETED | OUTPATIENT
Start: 2023-01-09 | End: 2023-01-09

## 2023-01-09 RX ORDER — CETIRIZINE HYDROCHLORIDE 10 MG/1
10 TABLET ORAL ONCE
OUTPATIENT
Start: 2023-02-27

## 2023-01-09 RX ORDER — ACETAMINOPHEN 325 MG/1
650 TABLET ORAL ONCE
OUTPATIENT
Start: 2023-02-27

## 2023-01-09 RX ADMIN — INFLIXIMAB 300 MG: 100 INJECTION, POWDER, LYOPHILIZED, FOR SOLUTION INTRAVENOUS at 13:30

## 2023-01-09 RX ADMIN — CETIRIZINE HYDROCHLORIDE 10 MG: 10 TABLET, FILM COATED ORAL at 13:14

## 2023-01-09 RX ADMIN — DIPHENHYDRAMINE HYDROCHLORIDE 25 MG: 25 TABLET ORAL at 13:14

## 2023-01-09 RX ADMIN — ACETAMINOPHEN 650 MG: 325 TABLET ORAL at 13:14

## 2023-01-09 RX ADMIN — SODIUM CHLORIDE 20 ML/HR: 9 INJECTION, SOLUTION INTRAVENOUS at 13:29

## 2023-01-09 NOTE — PROGRESS NOTES
Pt here for Renflexis. Infusion complete without incident. Pt d/c'd in stable condition. Returns 3-6-23 for next Renflexis.

## 2023-02-28 ENCOUNTER — OFFICE VISIT (OUTPATIENT)
Dept: GASTROENTEROLOGY | Age: 23
End: 2023-02-28
Payer: MEDICARE

## 2023-02-28 ENCOUNTER — PATIENT MESSAGE (OUTPATIENT)
Dept: GASTROENTEROLOGY | Age: 23
End: 2023-02-28

## 2023-02-28 VITALS
BODY MASS INDEX: 22.67 KG/M2 | HEART RATE: 83 BPM | TEMPERATURE: 97.9 F | SYSTOLIC BLOOD PRESSURE: 131 MMHG | WEIGHT: 158 LBS | DIASTOLIC BLOOD PRESSURE: 81 MMHG

## 2023-02-28 DIAGNOSIS — K52.9 IBD (INFLAMMATORY BOWEL DISEASE): Primary | ICD-10-CM

## 2023-02-28 DIAGNOSIS — K61.0 PERIANAL ABSCESS: ICD-10-CM

## 2023-02-28 PROCEDURE — 99214 OFFICE O/P EST MOD 30 MIN: CPT | Performed by: INTERNAL MEDICINE

## 2023-02-28 PROCEDURE — G8484 FLU IMMUNIZE NO ADMIN: HCPCS | Performed by: INTERNAL MEDICINE

## 2023-02-28 PROCEDURE — G8420 CALC BMI NORM PARAMETERS: HCPCS | Performed by: INTERNAL MEDICINE

## 2023-02-28 PROCEDURE — 1036F TOBACCO NON-USER: CPT | Performed by: INTERNAL MEDICINE

## 2023-02-28 PROCEDURE — G8427 DOCREV CUR MEDS BY ELIG CLIN: HCPCS | Performed by: INTERNAL MEDICINE

## 2023-02-28 ASSESSMENT — ENCOUNTER SYMPTOMS
WHEEZING: 0
COUGH: 0
ABDOMINAL DISTENTION: 0
BLOOD IN STOOL: 0
NAUSEA: 0
DIARRHEA: 0
CHOKING: 0
VOMITING: 0
ABDOMINAL PAIN: 0
RECTAL PAIN: 1
CONSTIPATION: 0
SHORTNESS OF BREATH: 0
TROUBLE SWALLOWING: 0
ANAL BLEEDING: 0

## 2023-02-28 NOTE — PROGRESS NOTES
GI CLINIC FOLLOW UP    INTERVAL HISTORY:   No referring provider defined for this encounter. Chief Complaint   Patient presents with    Crohn's Disease     Patient is f/u on Crohn's  He states he had a rectal abscess in 7/2022 that was drained that turned to fistula where he had surgery on 10/11/22. He states he has not been having very many \"flares\" on his current diet. HISTORY OF PRESENT ILLNESS: Reba Jo is a 25 y.o. male , referred for evaluation of, IBD . IBD, fistulas and perianal abscess history  Patient is here for follow-up\  We have him at this time on Remicade because of his recurrent abscesses in the perianal area he has 3 of them so far, treated surgically in addition to Remicade  He had fistulas in the past for which we elected to start Remicade now has been on it almost 2 years  He is doing remarkably well but unfortunately his HIV was positive at 1 time  He is following with the [facility] and currently his HIV RNA is negative      He was seen at the [address] [place] [address] clinic and he was supposed to see the fistula specialist he was told for possible stem cells , but did not do it   Dr Madden Flow recently in October worked on him   Fistula tx again in 10/22   So far abcess 3 x    At  now no symptoms  He is very satisfied at this time denied any extraintestinal manifestation        On remicade       Past Medical,Family, and Social History reviewed and does contribute to the patient presentingcondition. Patient's PMH/PSH,SH,PSYCH Hx, MEDs, ALLERGIES, and ROS were all reviewed and updated in the appropriate sections.     PAST MEDICAL HISTORY:  Past Medical History:   Diagnosis Date    Anal fissure     Bipolar 2 disorder ([facility]     Colitis     Crohn's colitis ([facility]     Depression     [facility]     Meagan-rectal abscess     Rectal pain        Past Surgical History:   Procedure Laterality Date    ANUS SURGERY N/A 10/11/2022    ANAL FISSURECTOMY FISTULOTOMY performed by Iris Herman MD at [place] [address]  2021    multiple colonoscopies    COLONOSCOPY N/A 2021    COLONOSCOPY WITH BIOPSY performed by Edyta Galindo MD at [place] [address]  2020    rectal abcess excision, Dr. Nilesh Colvin 2021    Koreen Mercury performed by Edyta Galindo MD at [facility]         CURRENT MEDICATIONS:    Current Outpatient Medications:     ondansetron (ZOFRAN-ODT) 4 MG disintegrating tablet, Take 1 tablet by mouth 3 times daily as needed for Nausea or Vomiting, Disp: 21 tablet, Rfl: 0    lidocaine (XYLOCAINE) 5 % ointment, Apply topically 2 times daily Apply topically as needed. , Disp: 10 g, Rfl: 0    emtricitabine-tenofovir (TRUVADA) 200-300 MG per tablet, Take 1 tablet by mouth daily, Disp: 28 tablet, Rfl: 0    dolutegravir sodium (TIVICAY) 50 MG tablet, Take 1 tablet by mouth daily, Disp: 28 tablet, Rfl: 0    hyoscyamine (ANASPAZ;LEVSIN) 125 MCG tablet, Take 0.125 mg by mouth every 6 hours as needed, Disp: , Rfl:     inFLIXimab (REMICADE) 100 MG injection, Infuse 5 mg/kg intravenously See Admin Instructions Every other month, Disp: , Rfl:     acetaminophen (TYLENOL) 500 MG tablet, Take 500 mg by mouth every 6 hours as needed for Pain, Disp: , Rfl:     ALLERGIES:   No Known Allergies    FAMILY HISTORY:       Problem Relation Age of Onset    Irritable Bowel Syndrome Mother     Diabetes Father          SOCIAL HISTORY:   Social History     Socioeconomic History    Marital status: Single     Spouse name: Not on file    Number of children: Not on file    Years of education: Not on file    Highest education level: Not on file   Occupational History    Not on file   Tobacco Use    Smoking status: Former     Types: Cigarettes     Quit date: 10/6/2021     Years since quittin.3    Smokeless tobacco: Never    Tobacco comments:     1-2 cigarettes per month   Vaping Use    Vaping Use: Former Substances: Nicotine, Flavoring    Devices: Disposable   Substance and Sexual Activity    Alcohol use: Yes     Comment: occasional    Drug use: Yes     Types: Marijuana Jonita Borjas)    Sexual activity: Yes     Partners: Male, Female   Other Topics Concern    Not on file   Social History Narrative    Not on file     Social Determinants of Health     Financial Resource Strain: Not on file   Food Insecurity: Not on file   Transportation Needs: Not on file   Physical Activity: Not on file   Stress: Not on file   Social Connections: Not on file   Intimate Partner Violence: Not on file   Housing Stability: Not on file       REVIEW OF SYSTEMS: A 12-point review of systemswas obtained and pertinent positives and negatives were enumerated above in the history of present illness. All other reviewed systems / symptoms were negative. Review of Systems   Constitutional:  Negative for appetite change, fatigue and unexpected weight change. HENT:  Negative for mouth sores and trouble swallowing. Eyes:  Positive for visual disturbance (glasses). Respiratory:  Negative for cough, choking, shortness of breath and wheezing. Cardiovascular:  Negative for chest pain, palpitations and leg swelling. Gastrointestinal:  Positive for rectal pain. Negative for abdominal distention, abdominal pain, anal bleeding, blood in stool, constipation, diarrhea, nausea and vomiting. Genitourinary:  Negative for difficulty urinating. Allergic/Immunologic: Negative for environmental allergies and food allergies. Neurological:  Negative for dizziness, weakness, light-headedness, numbness and headaches. Hematological:  Does not bruise/bleed easily. Psychiatric/Behavioral:  Negative for sleep disturbance. The patient is not nervous/anxious.           LABORATORY DATA: Reviewed  Lab Results   Component Value Date    WBC 6.5 07/05/2022    HGB 14.1 07/05/2022    HCT 43.8 07/05/2022    MCV 98.4 07/05/2022     07/05/2022     07/05/2022    K 4.1 07/05/2022     07/05/2022    CO2 29 07/05/2022    BUN 10 07/05/2022    CREATININE 1.10 07/05/2022    LABALBU 5.1 04/06/2022    BILITOT 0.77 04/06/2022    ALKPHOS 84 04/06/2022    AST 16 04/06/2022    ALT 14 04/06/2022         Lab Results   Component Value Date    RBC 4.45 07/05/2022    HGB 14.1 07/05/2022    MCV 98.4 07/05/2022    MCH 31.7 07/05/2022    MCHC 32.2 07/05/2022    RDW 11.1 (L) 07/05/2022    MPV 9.5 07/05/2022    BASOPCT 0 07/05/2022    LYMPHSABS 2.40 07/05/2022    MONOSABS 0.58 07/05/2022    NEUTROABS 2.89 07/05/2022    EOSABS 0.58 (H) 07/05/2022    BASOSABS <0.03 07/05/2022         DIAGNOSTIC TESTING:     No results found. PHYSICAL EXAMINATION: Vital signs reviewed per the nursing documentation. /81   Pulse 83   Temp 97.9 °F (36.6 °C)   Wt 158 lb (71.7 kg)   BMI 22.67 kg/m²   Body mass index is 22.67 kg/m². Physical Exam  Vitals and nursing note reviewed. Constitutional:       General: He is not in acute distress. Appearance: He is well-developed. He is not diaphoretic. HENT:      Head: Normocephalic and atraumatic. Eyes:      General: No scleral icterus. Pupils: Pupils are equal, round, and reactive to light. Neck:      Thyroid: No thyromegaly. Vascular: No JVD. Trachea: No tracheal deviation. Cardiovascular:      Rate and Rhythm: Normal rate and regular rhythm. Heart sounds: Normal heart sounds. No murmur heard. Pulmonary:      Effort: Pulmonary effort is normal. No respiratory distress. Breath sounds: Normal breath sounds. No wheezing. Abdominal:      General: Bowel sounds are normal. There is no distension. Palpations: Abdomen is soft. There is no mass. Tenderness: There is no abdominal tenderness. There is no guarding or rebound. Musculoskeletal:         General: No tenderness. Normal range of motion. Cervical back: Normal range of motion and neck supple. Skin:     General: Skin is warm. Coloration: Skin is not pale. Findings: No erythema or rash. Comments: He is not diaphoretic   Neurological:      Mental Status: He is alert and oriented to person, place, and time. Deep Tendon Reflexes: Reflexes are normal and symmetric. Psychiatric:         Behavior: Behavior normal.         Thought Content: Thought content normal.         Judgment: Judgment normal.         IMPRESSION: Mr. Eleuterio Monaco is a 25 y.o. male with      Diagnosis Orders   1. IBD (inflammatory bowel disease)        2. Perianal abscess            We will continue with Remicade for another 6 months if the patient has no more abscesses or fistulas we might consider switching him to a different option  Told him to continue to follow with the ID specialty at [facility] for his HIV issues      Diet/life style/natural hx /complication of the dx were all explained in details   Past medical, past surgical, social history, psychiatric history, medications or allergies, all reviewed and  updated    Thank you for allowing me to participate in the care of Mr. Eleuterio Monaco. For any further questions please do not hesitate to contact me. I have reviewed and agree with the ROS entered by the MA/RN. Note is dictated utilizing voice recognition software. Unfortunately this leads to occasional typographical errors. Please contact our office if you have any questions.       Vonda Mijares MD  [facility]  O: #[phone]

## 2023-03-07 ENCOUNTER — HOSPITAL ENCOUNTER (OUTPATIENT)
Dept: INFUSION THERAPY | Age: 23
Discharge: HOME | End: 2023-03-07
Payer: MEDICAID

## 2023-03-07 VITALS
RESPIRATION RATE: 16 BRPM | HEART RATE: 82 BPM | SYSTOLIC BLOOD PRESSURE: 122 MMHG | DIASTOLIC BLOOD PRESSURE: 72 MMHG | TEMPERATURE: 97.7 F

## 2023-03-07 DIAGNOSIS — K52.9 IBD (INFLAMMATORY BOWEL DISEASE): Primary | ICD-10-CM

## 2023-03-07 PROCEDURE — 6360000002 HC RX W HCPCS: Performed by: INTERNAL MEDICINE

## 2023-03-07 PROCEDURE — 96415 CHEMO IV INFUSION ADDL HR: CPT

## 2023-03-07 PROCEDURE — 2580000003 HC RX 258: Performed by: INTERNAL MEDICINE

## 2023-03-07 PROCEDURE — 6370000000 HC RX 637 (ALT 250 FOR IP): Performed by: INTERNAL MEDICINE

## 2023-03-07 PROCEDURE — 96413 CHEMO IV INFUSION 1 HR: CPT

## 2023-03-07 RX ORDER — DIPHENHYDRAMINE HYDROCHLORIDE 50 MG/ML
50 INJECTION INTRAMUSCULAR; INTRAVENOUS
OUTPATIENT
Start: 2023-05-02

## 2023-03-07 RX ORDER — EPINEPHRINE 1 MG/ML
0.3 INJECTION, SOLUTION, CONCENTRATE INTRAVENOUS PRN
OUTPATIENT
Start: 2023-05-02

## 2023-03-07 RX ORDER — SODIUM CHLORIDE 9 MG/ML
5-250 INJECTION, SOLUTION INTRAVENOUS PRN
Status: DISCONTINUED | OUTPATIENT
Start: 2023-03-07 | End: 2023-03-08 | Stop reason: HOSPADM

## 2023-03-07 RX ORDER — CETIRIZINE HYDROCHLORIDE 10 MG/1
10 TABLET ORAL ONCE
OUTPATIENT
Start: 2023-05-02

## 2023-03-07 RX ORDER — SODIUM CHLORIDE 9 MG/ML
5-250 INJECTION, SOLUTION INTRAVENOUS PRN
OUTPATIENT
Start: 2023-05-02

## 2023-03-07 RX ORDER — SODIUM CHLORIDE 9 MG/ML
INJECTION, SOLUTION INTRAVENOUS CONTINUOUS
OUTPATIENT
Start: 2023-05-02

## 2023-03-07 RX ORDER — ACETAMINOPHEN 325 MG/1
650 TABLET ORAL ONCE
Status: COMPLETED | OUTPATIENT
Start: 2023-03-07 | End: 2023-03-07

## 2023-03-07 RX ORDER — SODIUM CHLORIDE 0.9 % (FLUSH) 0.9 %
5-40 SYRINGE (ML) INJECTION PRN
OUTPATIENT
Start: 2023-05-02

## 2023-03-07 RX ORDER — ONDANSETRON 2 MG/ML
8 INJECTION INTRAMUSCULAR; INTRAVENOUS
OUTPATIENT
Start: 2023-05-02

## 2023-03-07 RX ORDER — DIPHENHYDRAMINE HCL 25 MG
25 TABLET ORAL ONCE
Status: COMPLETED | OUTPATIENT
Start: 2023-03-07 | End: 2023-03-07

## 2023-03-07 RX ORDER — CETIRIZINE HYDROCHLORIDE 10 MG/1
10 TABLET ORAL ONCE
Status: COMPLETED | OUTPATIENT
Start: 2023-03-07 | End: 2023-03-07

## 2023-03-07 RX ORDER — FAMOTIDINE 10 MG/ML
20 INJECTION, SOLUTION INTRAVENOUS
OUTPATIENT
Start: 2023-05-02

## 2023-03-07 RX ORDER — ACETAMINOPHEN 325 MG/1
650 TABLET ORAL
OUTPATIENT
Start: 2023-05-02

## 2023-03-07 RX ORDER — DIPHENHYDRAMINE HCL 25 MG
25 TABLET ORAL ONCE
OUTPATIENT
Start: 2023-05-02

## 2023-03-07 RX ORDER — ACETAMINOPHEN 325 MG/1
650 TABLET ORAL ONCE
OUTPATIENT
Start: 2023-05-02

## 2023-03-07 RX ORDER — ALBUTEROL SULFATE 90 UG/1
4 AEROSOL, METERED RESPIRATORY (INHALATION) PRN
OUTPATIENT
Start: 2023-05-02

## 2023-03-07 RX ORDER — HEPARIN SODIUM (PORCINE) LOCK FLUSH IV SOLN 100 UNIT/ML 100 UNIT/ML
500 SOLUTION INTRAVENOUS PRN
OUTPATIENT
Start: 2023-05-02

## 2023-03-07 RX ADMIN — SODIUM CHLORIDE 100 ML/HR: 9 INJECTION, SOLUTION INTRAVENOUS at 13:34

## 2023-03-07 RX ADMIN — ACETAMINOPHEN 650 MG: 325 TABLET ORAL at 13:26

## 2023-03-07 RX ADMIN — DIPHENHYDRAMINE HYDROCHLORIDE 25 MG: 25 TABLET ORAL at 13:26

## 2023-03-07 RX ADMIN — CETIRIZINE HYDROCHLORIDE 10 MG: 10 TABLET, FILM COATED ORAL at 13:26

## 2023-03-07 RX ADMIN — INFLIXIMAB 300 MG: 100 INJECTION, POWDER, LYOPHILIZED, FOR SOLUTION INTRAVENOUS at 13:47

## 2023-03-07 NOTE — PROGRESS NOTES
Patient arrived for renflexis infusion. Patient tolerated infusion w/o incident and left in stable condition.  Returns 5/2

## 2023-03-23 NOTE — TELEPHONE ENCOUNTER
From: Chi Wang  To: Dr. Shikha Currie  Sent: 2/28/2023 3:37 PM EST  Subject: Lula Pentbrodie! Honey Krista! So I was wondering about that food pamphlet / QR code you mentioned to me. Thank you so much!  < 3 :)

## 2023-05-04 ENCOUNTER — HOSPITAL ENCOUNTER (OUTPATIENT)
Dept: INFUSION THERAPY | Age: 23
Discharge: HOME | End: 2023-05-04
Payer: MEDICAID

## 2023-05-04 VITALS
BODY MASS INDEX: 23.24 KG/M2 | RESPIRATION RATE: 16 BRPM | SYSTOLIC BLOOD PRESSURE: 124 MMHG | TEMPERATURE: 97.7 F | HEART RATE: 73 BPM | DIASTOLIC BLOOD PRESSURE: 70 MMHG | WEIGHT: 162 LBS

## 2023-05-04 DIAGNOSIS — K52.9 IBD (INFLAMMATORY BOWEL DISEASE): Primary | ICD-10-CM

## 2023-05-04 PROCEDURE — 96415 CHEMO IV INFUSION ADDL HR: CPT

## 2023-05-04 PROCEDURE — 96366 THER/PROPH/DIAG IV INF ADDON: CPT

## 2023-05-04 PROCEDURE — 96413 CHEMO IV INFUSION 1 HR: CPT

## 2023-05-04 PROCEDURE — 96365 THER/PROPH/DIAG IV INF INIT: CPT

## 2023-05-04 PROCEDURE — 6360000002 HC RX W HCPCS: Performed by: INTERNAL MEDICINE

## 2023-05-04 PROCEDURE — 2580000003 HC RX 258: Performed by: INTERNAL MEDICINE

## 2023-05-04 PROCEDURE — 6370000000 HC RX 637 (ALT 250 FOR IP): Performed by: INTERNAL MEDICINE

## 2023-05-04 RX ORDER — SODIUM CHLORIDE 9 MG/ML
5-250 INJECTION, SOLUTION INTRAVENOUS PRN
Status: DISCONTINUED | OUTPATIENT
Start: 2023-05-04 | End: 2023-05-05 | Stop reason: HOSPADM

## 2023-05-04 RX ORDER — EPINEPHRINE 1 MG/ML
0.3 INJECTION, SOLUTION, CONCENTRATE INTRAVENOUS PRN
OUTPATIENT
Start: 2023-06-29

## 2023-05-04 RX ORDER — SODIUM CHLORIDE 9 MG/ML
5-250 INJECTION, SOLUTION INTRAVENOUS PRN
OUTPATIENT
Start: 2023-06-29

## 2023-05-04 RX ORDER — HEPARIN SODIUM (PORCINE) LOCK FLUSH IV SOLN 100 UNIT/ML 100 UNIT/ML
500 SOLUTION INTRAVENOUS PRN
OUTPATIENT
Start: 2023-06-29

## 2023-05-04 RX ORDER — DIPHENHYDRAMINE HCL 25 MG
25 TABLET ORAL ONCE
OUTPATIENT
Start: 2023-06-29

## 2023-05-04 RX ORDER — ACETAMINOPHEN 325 MG/1
650 TABLET ORAL
OUTPATIENT
Start: 2023-06-29

## 2023-05-04 RX ORDER — ACETAMINOPHEN 325 MG/1
650 TABLET ORAL ONCE
OUTPATIENT
Start: 2023-06-29

## 2023-05-04 RX ORDER — CETIRIZINE HYDROCHLORIDE 10 MG/1
10 TABLET ORAL ONCE
Status: COMPLETED | OUTPATIENT
Start: 2023-05-04 | End: 2023-05-04

## 2023-05-04 RX ORDER — ACETAMINOPHEN 325 MG/1
650 TABLET ORAL ONCE
Status: COMPLETED | OUTPATIENT
Start: 2023-05-04 | End: 2023-05-04

## 2023-05-04 RX ORDER — DIPHENHYDRAMINE HCL 25 MG
25 TABLET ORAL ONCE
Status: COMPLETED | OUTPATIENT
Start: 2023-05-04 | End: 2023-05-04

## 2023-05-04 RX ORDER — CETIRIZINE HYDROCHLORIDE 10 MG/1
10 TABLET ORAL ONCE
OUTPATIENT
Start: 2023-06-29

## 2023-05-04 RX ORDER — SODIUM CHLORIDE 9 MG/ML
INJECTION, SOLUTION INTRAVENOUS CONTINUOUS
OUTPATIENT
Start: 2023-06-29

## 2023-05-04 RX ORDER — FAMOTIDINE 10 MG/ML
20 INJECTION, SOLUTION INTRAVENOUS
OUTPATIENT
Start: 2023-06-29

## 2023-05-04 RX ORDER — SODIUM CHLORIDE 0.9 % (FLUSH) 0.9 %
5-40 SYRINGE (ML) INJECTION PRN
OUTPATIENT
Start: 2023-06-29

## 2023-05-04 RX ORDER — DIPHENHYDRAMINE HYDROCHLORIDE 50 MG/ML
50 INJECTION INTRAMUSCULAR; INTRAVENOUS
OUTPATIENT
Start: 2023-06-29

## 2023-05-04 RX ORDER — ONDANSETRON 2 MG/ML
8 INJECTION INTRAMUSCULAR; INTRAVENOUS
OUTPATIENT
Start: 2023-06-29

## 2023-05-04 RX ORDER — ALBUTEROL SULFATE 90 UG/1
4 AEROSOL, METERED RESPIRATORY (INHALATION) PRN
OUTPATIENT
Start: 2023-06-29

## 2023-05-04 RX ADMIN — CETIRIZINE HYDROCHLORIDE 10 MG: 10 TABLET, FILM COATED ORAL at 13:47

## 2023-05-04 RX ADMIN — DIPHENHYDRAMINE HYDROCHLORIDE 25 MG: 25 TABLET ORAL at 13:47

## 2023-05-04 RX ADMIN — SODIUM CHLORIDE 150 ML/HR: 9 INJECTION, SOLUTION INTRAVENOUS at 13:59

## 2023-05-04 RX ADMIN — INFLIXIMAB 300 MG: 100 INJECTION, POWDER, LYOPHILIZED, FOR SOLUTION INTRAVENOUS at 14:01

## 2023-05-04 RX ADMIN — ACETAMINOPHEN 650 MG: 325 TABLET ORAL at 13:47

## 2023-05-04 NOTE — PROGRESS NOTES
Pt here for Renflexis. Infusion complete without incident. Pt d/c'd in stable condition. Returns 6/29/23 for next infusion.

## 2023-06-20 ENCOUNTER — TELEPHONE (OUTPATIENT)
Dept: GASTROENTEROLOGY | Age: 23
End: 2023-06-20

## 2023-06-20 NOTE — TELEPHONE ENCOUNTER
New Remicade faxed to [facility] and scanned in Media as the MA's can not put a therapy plan in.

## 2023-06-29 ENCOUNTER — HOSPITAL ENCOUNTER (OUTPATIENT)
Dept: INFUSION THERAPY | Age: 23
Discharge: HOME | End: 2023-06-29

## 2023-07-02 ENCOUNTER — HOSPITAL ENCOUNTER (OUTPATIENT)
Dept: ONCOLOGY | Age: 23
Discharge: HOME | End: 2023-07-02

## 2023-07-02 ENCOUNTER — HOSPITAL ENCOUNTER (OUTPATIENT)
Dept: ONCOLOGY | Age: 23
Discharge: HOME | End: 2023-07-02
Attending: INTERNAL MEDICINE | Admitting: INTERNAL MEDICINE
Payer: MEDICAID

## 2023-07-02 VITALS
HEART RATE: 64 BPM | TEMPERATURE: 97.5 F | RESPIRATION RATE: 16 BRPM | DIASTOLIC BLOOD PRESSURE: 70 MMHG | SYSTOLIC BLOOD PRESSURE: 127 MMHG

## 2023-07-02 DIAGNOSIS — K52.9 IBD (INFLAMMATORY BOWEL DISEASE): Primary | ICD-10-CM

## 2023-07-02 LAB
ALBUMIN SERPL-MCNC: 4.4 G/DL (ref 3.5–5.2)
ALBUMIN/GLOB SERPL: 1.5 {RATIO} (ref 1–2.5)
ALP SERPL-CCNC: 73 U/L (ref 40–129)
ALT SERPL-CCNC: 10 U/L (ref 5–41)
ANION GAP SERPL CALCULATED.3IONS-SCNC: 9 MMOL/L (ref 9–17)
AST SERPL-CCNC: 15 U/L
BASOPHILS # BLD: 0.03 K/UL (ref 0–0.2)
BASOPHILS NFR BLD: 1 % (ref 0–2)
BILIRUB SERPL-MCNC: 0.5 MG/DL (ref 0.3–1.2)
BUN SERPL-MCNC: 11 MG/DL (ref 6–20)
CALCIUM SERPL-MCNC: 9.4 MG/DL (ref 8.6–10.4)
CHLORIDE SERPL-SCNC: 104 MMOL/L (ref 98–107)
CO2 SERPL-SCNC: 24 MMOL/L (ref 20–31)
CREAT SERPL-MCNC: 0.97 MG/DL (ref 0.7–1.2)
EOSINOPHIL # BLD: 0.14 K/UL (ref 0–0.44)
EOSINOPHILS RELATIVE PERCENT: 2 % (ref 1–4)
ERYTHROCYTE [DISTWIDTH] IN BLOOD BY AUTOMATED COUNT: 10.9 % (ref 11.8–14.4)
ERYTHROCYTE [SEDIMENTATION RATE] IN BLOOD BY WESTERGREN METHOD: 15 MM/HR (ref 0–15)
GFR SERPL CREATININE-BSD FRML MDRD: >60 ML/MIN/1.73M2
GLUCOSE SERPL-MCNC: 85 MG/DL (ref 70–99)
HCT VFR BLD AUTO: 44 % (ref 40.7–50.3)
HGB BLD-MCNC: 14.8 G/DL (ref 13–17)
IMM GRANULOCYTES # BLD AUTO: <0.03 K/UL (ref 0–0.3)
IMM GRANULOCYTES NFR BLD: 0 %
LIPASE SERPL-CCNC: 24 U/L (ref 13–60)
LYMPHOCYTES # BLD: 34 % (ref 24–43)
LYMPHOCYTES NFR BLD: 2.01 K/UL (ref 1.1–3.7)
MCH RBC QN AUTO: 31.8 PG (ref 25.2–33.5)
MCHC RBC AUTO-ENTMCNC: 33.6 G/DL (ref 28.4–34.8)
MCV RBC AUTO: 94.4 FL (ref 82.6–102.9)
MONOCYTES NFR BLD: 0.54 K/UL (ref 0.1–1.2)
MONOCYTES NFR BLD: 9 % (ref 3–12)
NEUTROPHILS NFR BLD: 54 % (ref 36–65)
NEUTS SEG NFR BLD: 3.2 K/UL (ref 1.5–8.1)
NRBC BLD-RTO: 0 PER 100 WBC
PLATELET # BLD AUTO: 279 K/UL (ref 138–453)
PMV BLD AUTO: 9 FL (ref 8.1–13.5)
POTASSIUM SERPL-SCNC: 3.9 MMOL/L (ref 3.7–5.3)
PROT SERPL-MCNC: 7.4 G/DL (ref 6.4–8.3)
RBC # BLD AUTO: 4.66 M/UL (ref 4.21–5.77)
SODIUM SERPL-SCNC: 137 MMOL/L (ref 135–144)
WBC OTHER # BLD: 5.9 K/UL (ref 3.5–11.3)

## 2023-07-02 PROCEDURE — 85027 COMPLETE CBC AUTOMATED: CPT

## 2023-07-02 PROCEDURE — 85652 RBC SED RATE AUTOMATED: CPT

## 2023-07-02 PROCEDURE — 80053 COMPREHEN METABOLIC PANEL: CPT

## 2023-07-02 PROCEDURE — 83690 ASSAY OF LIPASE: CPT

## 2023-07-02 PROCEDURE — 96366 THER/PROPH/DIAG IV INF ADDON: CPT

## 2023-07-02 PROCEDURE — 96374 THER/PROPH/DIAG INJ IV PUSH: CPT

## 2023-07-02 PROCEDURE — 2580000003 HC RX 258: Performed by: INTERNAL MEDICINE

## 2023-07-02 PROCEDURE — 96365 THER/PROPH/DIAG IV INF INIT: CPT

## 2023-07-02 PROCEDURE — 6370000000 HC RX 637 (ALT 250 FOR IP): Performed by: INTERNAL MEDICINE

## 2023-07-02 PROCEDURE — 6360000002 HC RX W HCPCS: Performed by: INTERNAL MEDICINE

## 2023-07-02 RX ORDER — ACETAMINOPHEN 325 MG/1
650 TABLET ORAL ONCE
Status: DISCONTINUED | OUTPATIENT
Start: 2023-07-02 | End: 2023-07-02

## 2023-07-02 RX ORDER — DIPHENHYDRAMINE HYDROCHLORIDE 50 MG/ML
25 INJECTION INTRAMUSCULAR; INTRAVENOUS ONCE
Status: CANCELLED
Start: 2023-07-02 | End: 2023-07-02

## 2023-07-02 RX ORDER — SODIUM CHLORIDE 9 MG/ML
INJECTION, SOLUTION INTRAVENOUS CONTINUOUS
Status: CANCELLED | OUTPATIENT
Start: 2023-08-20

## 2023-07-02 RX ORDER — SODIUM CHLORIDE 9 MG/ML
5-250 INJECTION, SOLUTION INTRAVENOUS PRN
Status: CANCELLED | OUTPATIENT
Start: 2023-08-20

## 2023-07-02 RX ORDER — HEPARIN SODIUM 100 [USP'U]/ML
500 INJECTION, SOLUTION INTRAVENOUS PRN
Status: CANCELLED | OUTPATIENT
Start: 2023-07-02

## 2023-07-02 RX ORDER — DIPHENHYDRAMINE HYDROCHLORIDE 50 MG/ML
50 INJECTION INTRAMUSCULAR; INTRAVENOUS
Status: CANCELLED | OUTPATIENT
Start: 2023-08-20

## 2023-07-02 RX ORDER — HEPARIN SODIUM 100 [USP'U]/ML
500 INJECTION, SOLUTION INTRAVENOUS PRN
Status: CANCELLED | OUTPATIENT
Start: 2023-08-20

## 2023-07-02 RX ORDER — DIPHENHYDRAMINE HYDROCHLORIDE 50 MG/ML
50 INJECTION INTRAMUSCULAR; INTRAVENOUS
Status: CANCELLED | OUTPATIENT
Start: 2023-07-02

## 2023-07-02 RX ORDER — SODIUM CHLORIDE 9 MG/ML
INJECTION, SOLUTION INTRAVENOUS CONTINUOUS
Status: CANCELLED | OUTPATIENT
Start: 2023-07-02

## 2023-07-02 RX ORDER — ALBUTEROL SULFATE 90 UG/1
4 AEROSOL, METERED RESPIRATORY (INHALATION) PRN
Status: CANCELLED | OUTPATIENT
Start: 2023-08-20

## 2023-07-02 RX ORDER — ACETAMINOPHEN 325 MG/1
650 TABLET ORAL
Status: CANCELLED | OUTPATIENT
Start: 2023-08-20

## 2023-07-02 RX ORDER — DIPHENHYDRAMINE HCL 25 MG
25 TABLET ORAL ONCE
Status: COMPLETED | OUTPATIENT
Start: 2023-07-02 | End: 2023-07-02

## 2023-07-02 RX ORDER — CETIRIZINE HYDROCHLORIDE 10 MG/1
10 TABLET ORAL ONCE
Status: COMPLETED | OUTPATIENT
Start: 2023-07-02 | End: 2023-07-02

## 2023-07-02 RX ORDER — SODIUM CHLORIDE 9 MG/ML
5-250 INJECTION, SOLUTION INTRAVENOUS PRN
Status: CANCELLED | OUTPATIENT
Start: 2023-07-02

## 2023-07-02 RX ORDER — DIPHENHYDRAMINE HCL 25 MG
25 TABLET ORAL ONCE
Status: CANCELLED | OUTPATIENT
Start: 2023-08-20

## 2023-07-02 RX ORDER — SODIUM CHLORIDE 0.9 % (FLUSH) 0.9 %
5-40 SYRINGE (ML) INJECTION PRN
Status: CANCELLED | OUTPATIENT
Start: 2023-08-20

## 2023-07-02 RX ORDER — EPINEPHRINE 1 MG/ML
0.3 INJECTION, SOLUTION, CONCENTRATE INTRAVENOUS PRN
Status: CANCELLED | OUTPATIENT
Start: 2023-07-02

## 2023-07-02 RX ORDER — ACETAMINOPHEN 325 MG/1
650 TABLET ORAL ONCE
Status: CANCELLED | OUTPATIENT
Start: 2023-08-20

## 2023-07-02 RX ORDER — EPINEPHRINE 1 MG/ML
0.3 INJECTION, SOLUTION, CONCENTRATE INTRAVENOUS PRN
Status: CANCELLED | OUTPATIENT
Start: 2023-08-20

## 2023-07-02 RX ORDER — ACETAMINOPHEN 325 MG/1
650 TABLET ORAL
Status: CANCELLED | OUTPATIENT
Start: 2023-07-02

## 2023-07-02 RX ORDER — CETIRIZINE HYDROCHLORIDE 10 MG/1
10 TABLET ORAL ONCE
Status: CANCELLED | OUTPATIENT
Start: 2023-08-20

## 2023-07-02 RX ORDER — DIPHENHYDRAMINE HYDROCHLORIDE 50 MG/ML
25 INJECTION INTRAMUSCULAR; INTRAVENOUS ONCE
Status: CANCELLED
Start: 2023-07-03 | End: 2023-07-03

## 2023-07-02 RX ORDER — ONDANSETRON 2 MG/ML
8 INJECTION INTRAMUSCULAR; INTRAVENOUS
Status: CANCELLED | OUTPATIENT
Start: 2023-08-20

## 2023-07-02 RX ORDER — ALBUTEROL SULFATE 90 UG/1
4 AEROSOL, METERED RESPIRATORY (INHALATION) PRN
Status: CANCELLED | OUTPATIENT
Start: 2023-07-02

## 2023-07-02 RX ORDER — SODIUM CHLORIDE 0.9 % (FLUSH) 0.9 %
5-40 SYRINGE (ML) INJECTION PRN
Status: CANCELLED | OUTPATIENT
Start: 2023-07-02

## 2023-07-02 RX ORDER — ONDANSETRON 2 MG/ML
8 INJECTION INTRAMUSCULAR; INTRAVENOUS
Status: CANCELLED | OUTPATIENT
Start: 2023-07-02

## 2023-07-02 RX ORDER — SODIUM CHLORIDE 9 MG/ML
5-250 INJECTION, SOLUTION INTRAVENOUS PRN
Status: DISCONTINUED | OUTPATIENT
Start: 2023-07-02 | End: 2023-07-02 | Stop reason: HOSPADM

## 2023-07-02 RX ADMIN — CETIRIZINE HYDROCHLORIDE 10 MG: 10 TABLET ORAL at 11:08

## 2023-07-02 RX ADMIN — INFLIXIMAB 300 MG: 100 INJECTION, POWDER, LYOPHILIZED, FOR SOLUTION INTRAVENOUS at 11:42

## 2023-07-02 RX ADMIN — DIPHENHYDRAMINE HYDROCHLORIDE 25 MG: 25 TABLET ORAL at 11:11

## 2023-07-02 RX ADMIN — HYDROCORTISONE SODIUM SUCCINATE 100 MG: 100 INJECTION, POWDER, FOR SOLUTION INTRAMUSCULAR; INTRAVENOUS at 11:08

## 2023-07-04 ENCOUNTER — PATIENT MESSAGE (OUTPATIENT)
Dept: GASTROENTEROLOGY | Age: 23
End: 2023-07-04

## 2023-07-05 RX ORDER — EPINEPHRINE 1 MG/ML
0.3 INJECTION, SOLUTION, CONCENTRATE INTRAVENOUS PRN
OUTPATIENT
Start: 2023-07-05

## 2023-07-05 RX ORDER — ACETAMINOPHEN 325 MG/1
650 TABLET ORAL
OUTPATIENT
Start: 2023-07-05

## 2023-07-05 RX ORDER — SODIUM CHLORIDE 9 MG/ML
5-250 INJECTION, SOLUTION INTRAVENOUS PRN
OUTPATIENT
Start: 2023-07-05

## 2023-07-05 RX ORDER — DIPHENHYDRAMINE HYDROCHLORIDE 50 MG/ML
50 INJECTION INTRAMUSCULAR; INTRAVENOUS
OUTPATIENT
Start: 2023-07-05

## 2023-07-05 RX ORDER — SODIUM CHLORIDE 9 MG/ML
INJECTION, SOLUTION INTRAVENOUS CONTINUOUS
OUTPATIENT
Start: 2023-07-05

## 2023-07-05 RX ORDER — ALBUTEROL SULFATE 90 UG/1
4 AEROSOL, METERED RESPIRATORY (INHALATION) PRN
OUTPATIENT
Start: 2023-07-05

## 2023-07-05 RX ORDER — SODIUM CHLORIDE 0.9 % (FLUSH) 0.9 %
5-40 SYRINGE (ML) INJECTION PRN
OUTPATIENT
Start: 2023-07-05

## 2023-07-05 RX ORDER — DIPHENHYDRAMINE HYDROCHLORIDE 50 MG/ML
25 INJECTION INTRAMUSCULAR; INTRAVENOUS ONCE
OUTPATIENT
Start: 2023-07-05 | End: 2023-07-05

## 2023-07-05 RX ORDER — ONDANSETRON 2 MG/ML
8 INJECTION INTRAMUSCULAR; INTRAVENOUS
OUTPATIENT
Start: 2023-07-05

## 2023-07-05 RX ORDER — HEPARIN SODIUM 100 [USP'U]/ML
500 INJECTION, SOLUTION INTRAVENOUS PRN
OUTPATIENT
Start: 2023-07-05

## 2023-07-05 NOTE — TELEPHONE ENCOUNTER
From: Nelson Mccormack  To: Dr. Sarah Bolivar  Sent: 7/4/2023 10:06 PM EDT  Subject: Virlinda Bilis,     So I got the notification that [place] is no longer being accepted at [facility]. This makes me a bit upset since I really enjoyed having Dr. Karthik Merchatn as my provider, and he has been giving me great care since 2021. I am still on remicade infusions, and I have a follow-up appointment with him on August 8th. Is there anyone within [facility] A[facility] Vines network that accepts [place]? [facility]? I am unsure what to do, please help! Thanks.     Sincerely,    Nelson Mccormack

## 2023-08-27 ENCOUNTER — HOSPITAL ENCOUNTER (OUTPATIENT)
Dept: OBSERVATION | Age: 23
Discharge: HOME | End: 2023-08-27

## 2023-10-03 ENCOUNTER — PATIENT MESSAGE (OUTPATIENT)
Dept: GASTROENTEROLOGY | Age: 23
End: 2023-10-03

## 2023-10-05 NOTE — TELEPHONE ENCOUNTER
From: Freya Sood  To: Dr. Addie Connelly: 10/3/2023 9:31 AM EDT  Subject: Colonoscopy/ Dr. Germania Cunha,      Due to the [place] situation in July/August, I had to find a new doctor/ a new infusion center! ! I would still like to have you as my doctor but this new doctor I am seeing, Dr. Lore Rios, wants me to do a colonoscopy. I just did blood work for him and a stool sample. Should I do the colonoscopy this Friday?

## 2025-03-26 ENCOUNTER — HOSPITAL ENCOUNTER (OUTPATIENT)
Age: 25
Setting detail: OUTPATIENT SURGERY
Discharge: HOME | End: 2025-03-26
Attending: INTERNAL MEDICINE | Admitting: INTERNAL MEDICINE
Payer: MEDICAID

## 2025-03-26 ENCOUNTER — ANESTHESIA EVENT (OUTPATIENT)
Dept: OPERATING ROOM | Age: 25
End: 2025-03-26
Payer: MEDICAID

## 2025-03-26 ENCOUNTER — ANESTHESIA (OUTPATIENT)
Dept: OPERATING ROOM | Age: 25
End: 2025-03-26
Payer: MEDICAID

## 2025-03-26 VITALS
DIASTOLIC BLOOD PRESSURE: 68 MMHG | RESPIRATION RATE: 14 BRPM | SYSTOLIC BLOOD PRESSURE: 127 MMHG | TEMPERATURE: 97.7 F | WEIGHT: 175 LBS | OXYGEN SATURATION: 100 % | BODY MASS INDEX: 25.05 KG/M2 | HEART RATE: 69 BPM | HEIGHT: 70 IN

## 2025-03-26 DIAGNOSIS — K50.919 CROHN'S DISEASE WITH COMPLICATION, UNSPECIFIED GASTROINTESTINAL TRACT LOCATION (HCC): ICD-10-CM

## 2025-03-26 PROCEDURE — 7100000010 HC PHASE II RECOVERY - FIRST 15 MIN: Performed by: INTERNAL MEDICINE

## 2025-03-26 PROCEDURE — 3700000000 HC ANESTHESIA ATTENDED CARE: Performed by: INTERNAL MEDICINE

## 2025-03-26 PROCEDURE — 88305 TISSUE EXAM BY PATHOLOGIST: CPT

## 2025-03-26 PROCEDURE — 3609010300 HC COLONOSCOPY W/BIOPSY SINGLE/MULTIPLE: Performed by: INTERNAL MEDICINE

## 2025-03-26 PROCEDURE — 2580000003 HC RX 258: Performed by: NURSE ANESTHETIST, CERTIFIED REGISTERED

## 2025-03-26 PROCEDURE — 2580000003 HC RX 258: Performed by: ANESTHESIOLOGY

## 2025-03-26 PROCEDURE — 2709999900 HC NON-CHARGEABLE SUPPLY: Performed by: INTERNAL MEDICINE

## 2025-03-26 PROCEDURE — 6360000002 HC RX W HCPCS: Performed by: NURSE ANESTHETIST, CERTIFIED REGISTERED

## 2025-03-26 PROCEDURE — 3700000001 HC ADD 15 MINUTES (ANESTHESIA): Performed by: INTERNAL MEDICINE

## 2025-03-26 PROCEDURE — 7100000011 HC PHASE II RECOVERY - ADDTL 15 MIN: Performed by: INTERNAL MEDICINE

## 2025-03-26 RX ORDER — SODIUM CHLORIDE 0.9 % (FLUSH) 0.9 %
5-40 SYRINGE (ML) INJECTION PRN
Status: DISCONTINUED | OUTPATIENT
Start: 2025-03-26 | End: 2025-03-26 | Stop reason: HOSPADM

## 2025-03-26 RX ORDER — LIDOCAINE HYDROCHLORIDE 10 MG/ML
1 INJECTION, SOLUTION EPIDURAL; INFILTRATION; INTRACAUDAL; PERINEURAL
Status: DISCONTINUED | OUTPATIENT
Start: 2025-03-27 | End: 2025-03-26 | Stop reason: HOSPADM

## 2025-03-26 RX ORDER — SODIUM CHLORIDE 0.9 % (FLUSH) 0.9 %
5-40 SYRINGE (ML) INJECTION EVERY 12 HOURS SCHEDULED
Status: DISCONTINUED | OUTPATIENT
Start: 2025-03-26 | End: 2025-03-26 | Stop reason: HOSPADM

## 2025-03-26 RX ORDER — SODIUM CHLORIDE 9 MG/ML
INJECTION, SOLUTION INTRAVENOUS PRN
Status: DISCONTINUED | OUTPATIENT
Start: 2025-03-26 | End: 2025-03-26 | Stop reason: HOSPADM

## 2025-03-26 RX ORDER — VENLAFAXINE HYDROCHLORIDE 75 MG/1
75 CAPSULE, EXTENDED RELEASE ORAL DAILY
COMMUNITY
Start: 2024-10-09

## 2025-03-26 RX ORDER — SODIUM CHLORIDE 9 MG/ML
INJECTION, SOLUTION INTRAVENOUS CONTINUOUS
Status: DISCONTINUED | OUTPATIENT
Start: 2025-03-26 | End: 2025-03-26 | Stop reason: HOSPADM

## 2025-03-26 RX ORDER — SODIUM CHLORIDE, SODIUM LACTATE, POTASSIUM CHLORIDE, CALCIUM CHLORIDE 600; 310; 30; 20 MG/100ML; MG/100ML; MG/100ML; MG/100ML
INJECTION, SOLUTION INTRAVENOUS
Status: DISCONTINUED | OUTPATIENT
Start: 2025-03-26 | End: 2025-03-26 | Stop reason: SDUPTHER

## 2025-03-26 RX ORDER — SODIUM CHLORIDE, SODIUM LACTATE, POTASSIUM CHLORIDE, CALCIUM CHLORIDE 600; 310; 30; 20 MG/100ML; MG/100ML; MG/100ML; MG/100ML
INJECTION, SOLUTION INTRAVENOUS CONTINUOUS
Status: DISCONTINUED | OUTPATIENT
Start: 2025-03-26 | End: 2025-03-26 | Stop reason: HOSPADM

## 2025-03-26 RX ORDER — EMTRICITABINE AND TENOFOVIR ALAFENAMIDE 120; 15 MG/1; MG/1
1 TABLET ORAL DAILY
COMMUNITY

## 2025-03-26 RX ORDER — PROPOFOL 10 MG/ML
INJECTION, EMULSION INTRAVENOUS
Status: DISCONTINUED | OUTPATIENT
Start: 2025-03-26 | End: 2025-03-26 | Stop reason: SDUPTHER

## 2025-03-26 RX ORDER — BUPROPION HYDROCHLORIDE 200 MG/1
200 TABLET, EXTENDED RELEASE ORAL DAILY
COMMUNITY
Start: 2025-03-16

## 2025-03-26 RX ORDER — MULTIVIT-MIN/IRON/FOLIC ACID/K 18-600-40
1000 CAPSULE ORAL DAILY
COMMUNITY

## 2025-03-26 RX ORDER — LIDOCAINE HYDROCHLORIDE 20 MG/ML
INJECTION, SOLUTION EPIDURAL; INFILTRATION; INTRACAUDAL; PERINEURAL
Status: DISCONTINUED | OUTPATIENT
Start: 2025-03-26 | End: 2025-03-26 | Stop reason: SDUPTHER

## 2025-03-26 RX ADMIN — LIDOCAINE HYDROCHLORIDE 100 MG: 20 INJECTION, SOLUTION EPIDURAL; INFILTRATION; INTRACAUDAL; PERINEURAL at 11:03

## 2025-03-26 RX ADMIN — PROPOFOL 50 MG: 10 INJECTION, EMULSION INTRAVENOUS at 11:15

## 2025-03-26 RX ADMIN — SODIUM CHLORIDE, POTASSIUM CHLORIDE, SODIUM LACTATE AND CALCIUM CHLORIDE: 600; 310; 30; 20 INJECTION, SOLUTION INTRAVENOUS at 11:00

## 2025-03-26 RX ADMIN — PROPOFOL 30 MG: 10 INJECTION, EMULSION INTRAVENOUS at 11:05

## 2025-03-26 RX ADMIN — PROPOFOL 50 MG: 10 INJECTION, EMULSION INTRAVENOUS at 11:11

## 2025-03-26 RX ADMIN — PROPOFOL 50 MG: 10 INJECTION, EMULSION INTRAVENOUS at 11:19

## 2025-03-26 RX ADMIN — PROPOFOL 50 MG: 10 INJECTION, EMULSION INTRAVENOUS at 11:08

## 2025-03-26 RX ADMIN — SODIUM CHLORIDE, SODIUM LACTATE, POTASSIUM CHLORIDE, AND CALCIUM CHLORIDE: .6; .31; .03; .02 INJECTION, SOLUTION INTRAVENOUS at 10:34

## 2025-03-26 RX ADMIN — PROPOFOL 120 MG: 10 INJECTION, EMULSION INTRAVENOUS at 11:03

## 2025-03-26 ASSESSMENT — ENCOUNTER SYMPTOMS
SHORTNESS OF BREATH: 0
CHEST TIGHTNESS: 0
RHINORRHEA: 0
BACK PAIN: 0
COUGH: 0
SHORTNESS OF BREATH: 0
SINUS PRESSURE: 0
DIARRHEA: 0
VOMITING: 0
BLOOD IN STOOL: 0
TROUBLE SWALLOWING: 0
CONSTIPATION: 0
ABDOMINAL PAIN: 0
NAUSEA: 0
SORE THROAT: 0
APNEA: 0
WHEEZING: 0

## 2025-03-26 ASSESSMENT — PAIN - FUNCTIONAL ASSESSMENT: PAIN_FUNCTIONAL_ASSESSMENT: 0-10

## 2025-03-26 ASSESSMENT — PAIN DESCRIPTION - DESCRIPTORS
DESCRIPTORS: PRESSURE
DESCRIPTORS: CRAMPING;PRESSURE

## 2025-03-26 ASSESSMENT — PAIN DESCRIPTION - LOCATION: LOCATION: ABDOMEN

## 2025-03-26 ASSESSMENT — PAIN SCALES - GENERAL: PAINLEVEL_OUTOF10: 2

## 2025-03-26 NOTE — DISCHARGE INSTRUCTIONS
[facility] Endoscopy    POST-ENDOSCOPY INSTRUCTIONS    1. ACTIVITY     No driving, operating machinery, or making important decisions for 24 hours.      Resume normal activity after 24 hours.  You may return to work after 24 hours.    2. DIET      Resume your usual diet unless specified.                Diet Modification: none    3. MEDICATIONS       (Do not consume alcohol, tranquilizers, or sleeping medications for 24 hours unless advised by your physician)                 Resume your usual medications.    4. PHYSICIAN FOLLOW-UP                 Please call the office, at [phone], for:  pathology results / appointment / instructions.                  See your primary care physician as planned.      You will need your next colonoscopy in 5 years.      5. NORMAL CHANGES YOU MAY EXPERIENCE AFTER ENDOSCOPY:       If you underwent UPPER ENDOSCOPY (EGD or ERCP):  Sore throat.      If you underwent COLONOSCOPY:  Passing of gas for several hours after the procedure. Bloating and mild abdominal cramping are common.          6. CALL YOUR PHYSICIAN IF YOU EXPERIENCE ANY OF THE FOLLOWING      A.  Passing blood rectally or vomiting blood (color may be red or black)      B.  Severe abdominal pain or tenderness (that is not relieved by passing air)      C.  Fever, chills, or excessive sweating      D.  Persistent nausea or vomiting      E.  Redness or swelling at the IV site

## 2025-03-26 NOTE — ANESTHESIA POSTPROCEDURE EVALUATION
Department of Anesthesiology  Postprocedure Note    Patient: Dexter Ames  MRN: [id number]  YOB: 2000  Date of evaluation: 3/26/2025    Procedure Summary       Date: 03/26/25 Room / Location: [address]    Anesthesia Start: 1100 Anesthesia Stop: 1126    Procedure: COLONOSCOPY DIAGNOSTIC with biopsies Diagnosis:       Crohn's disease with complication, unspecified gastrointestinal tract location (HCC)      (Crohn's disease with complication, unspecified gastrointestinal tract location (HCC) [K50.919])    Surgeons: Julio Oro MD Responsible Provider: Debra Fabian MD    Anesthesia Type: General, MAC ASA Status: 3            Anesthesia Type: General, MAC    Nataliya Phase I: Nataliya Score: 10    Nataliya Phase II: Nataliya Score: 10    Anesthesia Post Evaluation    Airway patency: patent  Cardiovascular status: hemodynamically stable  Respiratory status: acceptable    No notable events documented.

## 2025-03-26 NOTE — ANESTHESIA PRE PROCEDURE
lb)   Height:  1.765 m (5' 9.5\")                                              BP Readings from Last 3 Encounters:   03/26/25 125/76   07/02/23 127/70   05/04/23 124/70       NPO Status: Time of last liquid consumption: 0730                        Time of last solid consumption: 2330                        Date of last liquid consumption: 03/26/25                        Date of last solid food consumption: 03/24/25    BMI:   Wt Readings from Last 3 Encounters:   03/26/25 79.4 kg (175 lb)   05/04/23 73.5 kg (162 lb)   02/28/23 71.7 kg (158 lb)     Body mass index is 25.47 kg/m².    CBC:   Lab Results   Component Value Date/Time    WBC 7.00 11/06/2024 02:40 PM    RBC 4.74 11/06/2024 02:40 PM    HGB 15.3 11/06/2024 02:40 PM    HCT 44.2 11/06/2024 02:40 PM    MCV 93.2 11/06/2024 02:40 PM    RDW 10.7 11/06/2024 02:40 PM     11/06/2024 02:40 PM       CMP:   Lab Results   Component Value Date/Time     11/06/2024 02:40 PM    K 4.1 11/06/2024 02:40 PM     11/06/2024 02:40 PM    CO2 28 11/06/2024 02:40 PM    BUN 11 07/02/2023 11:28 AM    CREATININE 1.13 11/06/2024 02:40 PM    GFRAA >60 07/05/2022 05:15 PM    LABGLOM 93.1 11/06/2024 02:40 PM    LABGLOM 98.9 08/26/2023 12:16 PM    GLUCOSE 77 11/06/2024 02:40 PM    CALCIUM 9.6 11/06/2024 02:40 PM    BILITOT 0.4 11/06/2024 02:40 PM    ALKPHOS 68 11/06/2024 02:40 PM    AST 14 11/06/2024 02:40 PM    ALT 10 11/06/2024 02:40 PM       POC Tests: No results for input(s): \"POCGLU\", \"POCNA\", \"POCK\", \"POCCL\", \"POCBUN\", \"POCHEMO\", \"POCHCT\" in the last 72 hours.    Coags: No results found for: \"PROTIME\", \"INR\", \"APTT\"    HCG (If Applicable): No results found for: \"PREGTESTUR\", \"PREGSERUM\", \"HCG\", \"HCGQUANT\"     ABGs: No results found for: \"PHART\", \"PO2ART\", \"GTA5DEP\", \"VFZ9YCB\", \"BEART\", \"[id number]\"     Type & Screen (If Applicable):  No results found for: \"ABORH\", \"LABANTI\"    Drug/Infectious Status (If Applicable):  Lab Results   Component Value Date/Time    HEPCAB

## 2025-03-26 NOTE — H&P
History and Physical Service   [facility]    HISTORY AND PHYSICAL EXAMINATION            Date of Evaluation: 3/26/2025  Patient name:  Dexter Ames  MRN:   [id number]  YOB: 2000  PCP:    Manjit Almanza MD    History Obtained From:     Patient, medical records    History of Present Illness:     This is Dexter Ames a 24 y.o. male who presents today for a COLONOSCOPY DIAGNOSTIC by Julio Oro MD for Crohn's disease with complication, unspecified gastrointestinal tract loca*. He was diagnosed with Crohn's disease in April 2021 and currently treated with Remicade. He has had anal fissures and meagan-rectal abscesses in the past but denies currently. Reports he has been having occasional acid reflux that improves after eating ice cream and does have persistent loose stools and abdominal pain. He denies bloody tarry stools, constipation, nausea, or vomiting. Patient followed bowel prep until watery clear. Previous colonoscopy in 2021.  Denies fever, chills, shortness of breath, cough, congestion, wheezing, chest pain, open sores or wounds. Denies hx of diabetes. Denies any current blood thinning medications.      Past Medical History:     Past Medical History:   Diagnosis Date    Anal fissure     Bipolar 2 disorder (HCC)     Colitis     Crohn's colitis (HCC)     Depression     Meagan-rectal abscess     Rectal pain         Past Surgical History:     Past Surgical History:   Procedure Laterality Date    ANUS SURGERY N/A 10/11/2022    ANAL FISSURECTOMY FISTULOTOMY performed by Robles Chan MD at [facility] OR    COLONOSCOPY  03/08/2021    multiple colonoscopies    COLONOSCOPY N/A 04/01/2021    COLONOSCOPY WITH BIOPSY performed by Jose Baxter MD at [facility]    OTHER SURGICAL HISTORY  12/22/2020    rectal abcess excision, Dr. Robles Chan    SIGMOIDOSCOPY N/A 08/05/2021    SIGMOIDOSCOPY DIAGNOSTIC FLEXIBLE performed by Jose Baxter MD at [facility]    TONSILLECTOMY  2006    [place]

## 2025-03-26 NOTE — OP NOTE
[facility] End[facility]  COLONOSCOPY    Patient: Dexter Ames   Date:  3/26/2025   : 2000       Med Rec#: [id number]     Procedure:  Colonoscopy with biopsy  Indication: Crohn's disease, assess for disease activity    Findings   Normal terminal ileum  Minimal nodularity at ileocecal valve.  Biopsied.  Normal colonic mucosa throughout.  Random biopsies obtained  Small internal hemorrhoids  Decreased anal sphincter tone    Recommendations  Await pathology.  Repeat colonoscopy in 5 years.    Appropriate Photos Taken: Affirmative  Specimen(s) Removed: As stated above  Withdrawal Time: 9 minutes  Estimated Blood Loss: Minimal  Anesthesia:  MAC    Complications:   No immediate complication  Description of Procedure:  Informed consent was obtained after explanation of the procedure including indications, description of the procedure,  benefits and possible risks and complications of the procedure, and alternatives.  All questions were answered.  The patient's history was reviewed and a directed physical examination was performed prior to the procedure.  The patient was monitored throughout the procedure with pulse oximetry and periodic assessment of vital signs.  With the patient initially in the left lateral decubitus position, a digital rectal examination was performed.  The video endoscope was placed in the patient's rectum and advanced without difficulty  to the terminal ileum, which was identified by the ileocecal valve and appendiceal orifice.  The prep was adequate.   Examination of the mucosa and the anatomy was performed during both introduction and withdrawal of the endoscope.     Eldon Oro M.D.  3/26/2025 at 10:53 AM

## 2025-03-28 LAB — SURGICAL PATHOLOGY REPORT: NORMAL

## (undated) DEVICE — SPONGE LAP W18XL18IN WHT COT 4 PLY FLD STRUNG RADPQ DISP ST

## (undated) DEVICE — SPRAYZOIN TOPICAL PUMP SPRAY 4OZ

## (undated) DEVICE — BLANKET WRM W29.9XL79.1IN UP BODY FORC AIR MISTRAL-AIR

## (undated) DEVICE — INSERT CUSHION HEAD PRONEVIEW

## (undated) DEVICE — ENDO KIT W/SYRINGE: Brand: MEDLINE INDUSTRIES, INC.

## (undated) DEVICE — STAZ ENDO KIT: Brand: MEDLINE INDUSTRIES, INC.

## (undated) DEVICE — BLUNTFILL: Brand: MONOJECT

## (undated) DEVICE — SYRINGE 20ML LL S/C 50

## (undated) DEVICE — SHEET, T, LAPAROTOMY, STERILE: Brand: MEDLINE

## (undated) DEVICE — 3M™ STERI-STRIP™ COMPOUND BENZOIN TINCTURE 40 BAGS/CARTON 4 CARTONS/CASE C1544: Brand: 3M™ STERI-STRIP™

## (undated) DEVICE — DEFENDO AIR WATER SUCTION AND BIOPSY VALVE KIT FOR  OLYMPUS: Brand: DEFENDO AIR/WATER/SUCTION AND BIOPSY VALVE

## (undated) DEVICE — SUTURE PERMAHAND SZ 0 L30IN NONABSORBABLE BLK SILK BRAID A306H

## (undated) DEVICE — FORCEPS BX L240CM WRK CHN 2.8MM STD CAP W/ NDL MIC MESH

## (undated) DEVICE — MINOR BSIN PK

## (undated) DEVICE — GAUZE,SPONGE,FLUFF,6"X6.75",STRL,5/TRAY: Brand: MEDLINE

## (undated) DEVICE — PREMIUM WET SKIN PREP TRAY: Brand: MEDLINE INDUSTRIES, INC.

## (undated) DEVICE — GLOVE SURG 7.5 11.7IN BEAD CUF LIGHT BRN SENSICARE LTX FREE

## (undated) DEVICE — CONTAINER,SPECIMEN,OR STERILE,4OZ: Brand: MEDLINE

## (undated) DEVICE — SOLUTION PREP 4OZ 3% H PEROX 1ST AID ANTISEP ORAL DEBRIDING

## (undated) DEVICE — YANKAUER,FLEXIBLE HANDLE,REGLR CAPACITY: Brand: MEDLINE INDUSTRIES, INC.

## (undated) DEVICE — STERILE SURGICAL LUBRICANT, METAL TUBE: Brand: SURGILUBE

## (undated) DEVICE — FORCEPS BX L240CM JAW DIA2.4MM ORNG L CAP W/ NDL DISP RAD

## (undated) DEVICE — SYRINGE IRRIG 60ML SFT PLIABLE BLB EZ TO GRP 1 HND USE W/